# Patient Record
Sex: MALE | Race: AMERICAN INDIAN OR ALASKA NATIVE | NOT HISPANIC OR LATINO | Employment: OTHER | URBAN - METROPOLITAN AREA
[De-identification: names, ages, dates, MRNs, and addresses within clinical notes are randomized per-mention and may not be internally consistent; named-entity substitution may affect disease eponyms.]

---

## 2017-02-21 ENCOUNTER — COMMUNICATION - HEALTHEAST (OUTPATIENT)
Dept: FAMILY MEDICINE | Facility: CLINIC | Age: 62
End: 2017-02-21

## 2017-02-21 DIAGNOSIS — I10 ESSENTIAL HYPERTENSION, BENIGN: ICD-10-CM

## 2017-04-06 ENCOUNTER — COMMUNICATION - HEALTHEAST (OUTPATIENT)
Dept: FAMILY MEDICINE | Facility: CLINIC | Age: 62
End: 2017-04-06

## 2017-04-06 DIAGNOSIS — N52.9 IMPOTENCE OF ORGANIC ORIGIN: ICD-10-CM

## 2017-04-16 ENCOUNTER — COMMUNICATION - HEALTHEAST (OUTPATIENT)
Dept: FAMILY MEDICINE | Facility: CLINIC | Age: 62
End: 2017-04-16

## 2017-04-16 DIAGNOSIS — E78.5 HYPERLIPIDEMIA: ICD-10-CM

## 2017-07-20 ENCOUNTER — COMMUNICATION - HEALTHEAST (OUTPATIENT)
Dept: FAMILY MEDICINE | Facility: CLINIC | Age: 62
End: 2017-07-20

## 2017-07-20 DIAGNOSIS — I10 ESSENTIAL HYPERTENSION, BENIGN: ICD-10-CM

## 2017-09-07 ENCOUNTER — COMMUNICATION - HEALTHEAST (OUTPATIENT)
Dept: FAMILY MEDICINE | Facility: CLINIC | Age: 62
End: 2017-09-07

## 2017-09-07 DIAGNOSIS — N52.9 IMPOTENCE OF ORGANIC ORIGIN: ICD-10-CM

## 2017-11-09 ENCOUNTER — OFFICE VISIT - HEALTHEAST (OUTPATIENT)
Dept: FAMILY MEDICINE | Facility: CLINIC | Age: 62
End: 2017-11-09

## 2017-11-09 DIAGNOSIS — Z12.5 SCREENING FOR PROSTATE CANCER: ICD-10-CM

## 2017-11-09 DIAGNOSIS — K02.9 DENTAL CARIES: ICD-10-CM

## 2017-11-09 DIAGNOSIS — E55.9 VITAMIN D DEFICIENCY DISEASE: ICD-10-CM

## 2017-11-09 DIAGNOSIS — I10 ESSENTIAL HYPERTENSION, BENIGN: ICD-10-CM

## 2017-11-09 DIAGNOSIS — Z00.00 WELL ADULT EXAM: ICD-10-CM

## 2017-11-09 LAB — PSA SERPL-MCNC: 0.9 NG/ML (ref 0–4.5)

## 2017-11-09 ASSESSMENT — MIFFLIN-ST. JEOR: SCORE: 1755.82

## 2017-11-15 ENCOUNTER — COMMUNICATION - HEALTHEAST (OUTPATIENT)
Dept: FAMILY MEDICINE | Facility: CLINIC | Age: 62
End: 2017-11-15

## 2018-01-10 ENCOUNTER — COMMUNICATION - HEALTHEAST (OUTPATIENT)
Dept: FAMILY MEDICINE | Facility: CLINIC | Age: 63
End: 2018-01-10

## 2018-01-10 DIAGNOSIS — I10 ESSENTIAL HYPERTENSION, BENIGN: ICD-10-CM

## 2018-01-13 ENCOUNTER — COMMUNICATION - HEALTHEAST (OUTPATIENT)
Dept: FAMILY MEDICINE | Facility: CLINIC | Age: 63
End: 2018-01-13

## 2018-01-13 DIAGNOSIS — N52.9 IMPOTENCE OF ORGANIC ORIGIN: ICD-10-CM

## 2018-05-04 ENCOUNTER — COMMUNICATION - HEALTHEAST (OUTPATIENT)
Dept: FAMILY MEDICINE | Facility: CLINIC | Age: 63
End: 2018-05-04

## 2018-05-04 DIAGNOSIS — E78.5 HYPERLIPIDEMIA: ICD-10-CM

## 2018-06-11 ENCOUNTER — COMMUNICATION - HEALTHEAST (OUTPATIENT)
Dept: FAMILY MEDICINE | Facility: CLINIC | Age: 63
End: 2018-06-11

## 2018-06-11 DIAGNOSIS — N52.9 IMPOTENCE OF ORGANIC ORIGIN: ICD-10-CM

## 2018-07-01 ENCOUNTER — COMMUNICATION - HEALTHEAST (OUTPATIENT)
Dept: FAMILY MEDICINE | Facility: CLINIC | Age: 63
End: 2018-07-01

## 2018-07-01 DIAGNOSIS — I10 ESSENTIAL HYPERTENSION, BENIGN: ICD-10-CM

## 2018-10-17 ENCOUNTER — COMMUNICATION - HEALTHEAST (OUTPATIENT)
Dept: FAMILY MEDICINE | Facility: CLINIC | Age: 63
End: 2018-10-17

## 2018-10-17 DIAGNOSIS — N52.9 IMPOTENCE OF ORGANIC ORIGIN: ICD-10-CM

## 2019-01-10 ENCOUNTER — COMMUNICATION - HEALTHEAST (OUTPATIENT)
Dept: FAMILY MEDICINE | Facility: CLINIC | Age: 64
End: 2019-01-10

## 2019-01-10 DIAGNOSIS — N52.9 IMPOTENCE OF ORGANIC ORIGIN: ICD-10-CM

## 2019-01-15 ENCOUNTER — COMMUNICATION - HEALTHEAST (OUTPATIENT)
Dept: FAMILY MEDICINE | Facility: CLINIC | Age: 64
End: 2019-01-15

## 2019-01-15 DIAGNOSIS — E78.5 HYPERLIPIDEMIA: ICD-10-CM

## 2019-01-31 ENCOUNTER — COMMUNICATION - HEALTHEAST (OUTPATIENT)
Dept: FAMILY MEDICINE | Facility: CLINIC | Age: 64
End: 2019-01-31

## 2019-01-31 DIAGNOSIS — I10 ESSENTIAL HYPERTENSION, BENIGN: ICD-10-CM

## 2019-02-02 ENCOUNTER — COMMUNICATION - HEALTHEAST (OUTPATIENT)
Dept: FAMILY MEDICINE | Facility: CLINIC | Age: 64
End: 2019-02-02

## 2019-02-02 DIAGNOSIS — N52.9 IMPOTENCE OF ORGANIC ORIGIN: ICD-10-CM

## 2019-02-04 RX ORDER — TADALAFIL 5 MG/1
TABLET ORAL
Qty: 30 TABLET | Refills: 10 | Status: SHIPPED | OUTPATIENT
Start: 2019-02-04 | End: 2021-07-26

## 2019-03-27 ENCOUNTER — COMMUNICATION - HEALTHEAST (OUTPATIENT)
Dept: FAMILY MEDICINE | Facility: CLINIC | Age: 64
End: 2019-03-27

## 2019-04-08 ENCOUNTER — COMMUNICATION - HEALTHEAST (OUTPATIENT)
Dept: FAMILY MEDICINE | Facility: CLINIC | Age: 64
End: 2019-04-08

## 2019-04-08 DIAGNOSIS — E78.5 HYPERLIPIDEMIA: ICD-10-CM

## 2019-04-23 ENCOUNTER — OFFICE VISIT - HEALTHEAST (OUTPATIENT)
Dept: FAMILY MEDICINE | Facility: CLINIC | Age: 64
End: 2019-04-23

## 2019-04-23 ENCOUNTER — AMBULATORY - HEALTHEAST (OUTPATIENT)
Dept: FAMILY MEDICINE | Facility: CLINIC | Age: 64
End: 2019-04-23

## 2019-04-23 DIAGNOSIS — I10 ESSENTIAL HYPERTENSION, BENIGN: ICD-10-CM

## 2019-04-23 DIAGNOSIS — E55.9 VITAMIN D DEFICIENCY DISEASE: ICD-10-CM

## 2019-04-23 DIAGNOSIS — Z12.11 SCREEN FOR COLON CANCER: ICD-10-CM

## 2019-04-23 DIAGNOSIS — N52.9 IMPOTENCE OF ORGANIC ORIGIN: ICD-10-CM

## 2019-04-23 DIAGNOSIS — M25.552 HIP PAIN, LEFT: ICD-10-CM

## 2019-04-23 DIAGNOSIS — Z12.5 SCREENING FOR PROSTATE CANCER: ICD-10-CM

## 2019-04-23 DIAGNOSIS — R31.29 MICROHEMATURIA: ICD-10-CM

## 2019-04-23 DIAGNOSIS — Z00.00 WELL ADULT EXAM: ICD-10-CM

## 2019-04-23 DIAGNOSIS — E78.00 PURE HYPERCHOLESTEROLEMIA: ICD-10-CM

## 2019-04-23 DIAGNOSIS — F41.9 ANXIETY: ICD-10-CM

## 2019-04-23 LAB
ALBUMIN UR-MCNC: NEGATIVE MG/DL
APPEARANCE UR: CLEAR
BACTERIA #/AREA URNS HPF: ABNORMAL HPF
BILIRUB UR QL STRIP: NEGATIVE
COLOR UR AUTO: YELLOW
GLUCOSE UR STRIP-MCNC: NEGATIVE MG/DL
HGB UR QL STRIP: ABNORMAL
HYALINE CASTS #/AREA URNS LPF: ABNORMAL LPF
KETONES UR STRIP-MCNC: NEGATIVE MG/DL
LEUKOCYTE ESTERASE UR QL STRIP: NEGATIVE
MUCOUS THREADS #/AREA URNS LPF: ABNORMAL LPF
NITRATE UR QL: NEGATIVE
PH UR STRIP: 5.5 [PH] (ref 5–8)
RBC #/AREA URNS AUTO: ABNORMAL HPF
SP GR UR STRIP: 1.02 (ref 1–1.03)
SQUAMOUS #/AREA URNS AUTO: ABNORMAL LPF
UROBILINOGEN UR STRIP-ACNC: ABNORMAL
WBC #/AREA URNS AUTO: ABNORMAL HPF

## 2019-04-23 ASSESSMENT — MIFFLIN-ST. JEOR: SCORE: 1762.62

## 2019-04-24 ENCOUNTER — COMMUNICATION - HEALTHEAST (OUTPATIENT)
Dept: FAMILY MEDICINE | Facility: CLINIC | Age: 64
End: 2019-04-24

## 2019-04-24 ENCOUNTER — AMBULATORY - HEALTHEAST (OUTPATIENT)
Dept: FAMILY MEDICINE | Facility: CLINIC | Age: 64
End: 2019-04-24

## 2019-04-24 DIAGNOSIS — R31.29 MICROSCOPIC HEMATURIA: ICD-10-CM

## 2019-05-08 ENCOUNTER — COMMUNICATION - HEALTHEAST (OUTPATIENT)
Dept: FAMILY MEDICINE | Facility: CLINIC | Age: 64
End: 2019-05-08

## 2019-05-17 ENCOUNTER — COMMUNICATION - HEALTHEAST (OUTPATIENT)
Dept: FAMILY MEDICINE | Facility: CLINIC | Age: 64
End: 2019-05-17

## 2019-05-17 DIAGNOSIS — F41.9 ANXIETY: ICD-10-CM

## 2019-07-05 ENCOUNTER — COMMUNICATION - HEALTHEAST (OUTPATIENT)
Dept: FAMILY MEDICINE | Facility: CLINIC | Age: 64
End: 2019-07-05

## 2019-07-05 DIAGNOSIS — E78.5 HYPERLIPIDEMIA: ICD-10-CM

## 2019-10-11 ENCOUNTER — COMMUNICATION - HEALTHEAST (OUTPATIENT)
Dept: FAMILY MEDICINE | Facility: CLINIC | Age: 64
End: 2019-10-11

## 2019-10-11 DIAGNOSIS — N52.9 IMPOTENCE OF ORGANIC ORIGIN: ICD-10-CM

## 2019-11-06 ENCOUNTER — COMMUNICATION - HEALTHEAST (OUTPATIENT)
Dept: FAMILY MEDICINE | Facility: CLINIC | Age: 64
End: 2019-11-06

## 2019-11-06 DIAGNOSIS — N52.9 IMPOTENCE OF ORGANIC ORIGIN: ICD-10-CM

## 2019-12-12 ENCOUNTER — COMMUNICATION - HEALTHEAST (OUTPATIENT)
Dept: FAMILY MEDICINE | Facility: CLINIC | Age: 64
End: 2019-12-12

## 2019-12-12 DIAGNOSIS — N52.9 IMPOTENCE OF ORGANIC ORIGIN: ICD-10-CM

## 2020-01-16 ENCOUNTER — COMMUNICATION - HEALTHEAST (OUTPATIENT)
Dept: FAMILY MEDICINE | Facility: CLINIC | Age: 65
End: 2020-01-16

## 2020-03-14 ENCOUNTER — COMMUNICATION - HEALTHEAST (OUTPATIENT)
Dept: FAMILY MEDICINE | Facility: CLINIC | Age: 65
End: 2020-03-14

## 2020-03-14 DIAGNOSIS — I10 ESSENTIAL HYPERTENSION, BENIGN: ICD-10-CM

## 2020-03-14 DIAGNOSIS — N52.9 IMPOTENCE OF ORGANIC ORIGIN: ICD-10-CM

## 2020-03-30 ENCOUNTER — COMMUNICATION - HEALTHEAST (OUTPATIENT)
Dept: FAMILY MEDICINE | Facility: CLINIC | Age: 65
End: 2020-03-30

## 2020-03-30 DIAGNOSIS — F41.9 ANXIETY: ICD-10-CM

## 2020-04-24 ENCOUNTER — COMMUNICATION - HEALTHEAST (OUTPATIENT)
Dept: FAMILY MEDICINE | Facility: CLINIC | Age: 65
End: 2020-04-24

## 2020-04-24 DIAGNOSIS — N52.9 IMPOTENCE OF ORGANIC ORIGIN: ICD-10-CM

## 2020-05-08 ENCOUNTER — AMBULATORY - HEALTHEAST (OUTPATIENT)
Dept: FAMILY MEDICINE | Facility: CLINIC | Age: 65
End: 2020-05-08

## 2020-05-08 ENCOUNTER — OFFICE VISIT - HEALTHEAST (OUTPATIENT)
Dept: FAMILY MEDICINE | Facility: CLINIC | Age: 65
End: 2020-05-08

## 2020-05-08 DIAGNOSIS — E55.9 VITAMIN D DEFICIENCY DISEASE: ICD-10-CM

## 2020-05-08 DIAGNOSIS — Z12.11 SPECIAL SCREENING FOR MALIGNANT NEOPLASMS, COLON: ICD-10-CM

## 2020-05-08 DIAGNOSIS — Z12.5 SCREENING FOR PROSTATE CANCER: ICD-10-CM

## 2020-05-08 DIAGNOSIS — R03.0 ELEVATED BLOOD PRESSURE READING: ICD-10-CM

## 2020-05-08 DIAGNOSIS — E78.00 PURE HYPERCHOLESTEROLEMIA: ICD-10-CM

## 2020-05-08 DIAGNOSIS — Z11.4 SCREENING FOR HIV (HUMAN IMMUNODEFICIENCY VIRUS): ICD-10-CM

## 2020-05-08 DIAGNOSIS — I10 ESSENTIAL HYPERTENSION, BENIGN: ICD-10-CM

## 2020-05-08 DIAGNOSIS — Z11.59 ENCOUNTER FOR HEPATITIS C SCREENING TEST FOR LOW RISK PATIENT: ICD-10-CM

## 2020-05-08 RX ORDER — AMLODIPINE BESYLATE 5 MG/1
5 TABLET ORAL 2 TIMES DAILY
Qty: 60 TABLET | Refills: 5 | Status: SHIPPED | OUTPATIENT
Start: 2020-05-08 | End: 2021-07-26

## 2020-06-08 ENCOUNTER — COMMUNICATION - HEALTHEAST (OUTPATIENT)
Dept: FAMILY MEDICINE | Facility: CLINIC | Age: 65
End: 2020-06-08

## 2020-06-08 DIAGNOSIS — I10 ESSENTIAL HYPERTENSION, BENIGN: ICD-10-CM

## 2020-06-10 ENCOUNTER — COMMUNICATION - HEALTHEAST (OUTPATIENT)
Dept: FAMILY MEDICINE | Facility: CLINIC | Age: 65
End: 2020-06-10

## 2020-06-10 DIAGNOSIS — E78.5 HYPERLIPIDEMIA: ICD-10-CM

## 2020-06-15 ENCOUNTER — RECORDS - HEALTHEAST (OUTPATIENT)
Dept: ADMINISTRATIVE | Facility: OTHER | Age: 65
End: 2020-06-15

## 2020-06-15 LAB — COLOGUARD-ABSTRACT: NEGATIVE

## 2020-06-30 ENCOUNTER — COMMUNICATION - HEALTHEAST (OUTPATIENT)
Dept: FAMILY MEDICINE | Facility: CLINIC | Age: 65
End: 2020-06-30

## 2020-06-30 ENCOUNTER — RECORDS - HEALTHEAST (OUTPATIENT)
Dept: ADMINISTRATIVE | Facility: OTHER | Age: 65
End: 2020-06-30

## 2020-07-05 ENCOUNTER — COMMUNICATION - HEALTHEAST (OUTPATIENT)
Dept: FAMILY MEDICINE | Facility: CLINIC | Age: 65
End: 2020-07-05

## 2020-07-05 DIAGNOSIS — F41.9 ANXIETY: ICD-10-CM

## 2020-07-07 ENCOUNTER — RECORDS - HEALTHEAST (OUTPATIENT)
Dept: HEALTH INFORMATION MANAGEMENT | Facility: CLINIC | Age: 65
End: 2020-07-07

## 2020-07-15 ENCOUNTER — COMMUNICATION - HEALTHEAST (OUTPATIENT)
Dept: FAMILY MEDICINE | Facility: CLINIC | Age: 65
End: 2020-07-15

## 2020-07-15 DIAGNOSIS — N52.9 IMPOTENCE OF ORGANIC ORIGIN: ICD-10-CM

## 2020-07-25 ENCOUNTER — COMMUNICATION - HEALTHEAST (OUTPATIENT)
Dept: FAMILY MEDICINE | Facility: CLINIC | Age: 65
End: 2020-07-25

## 2020-07-25 DIAGNOSIS — I10 ESSENTIAL HYPERTENSION, BENIGN: ICD-10-CM

## 2020-08-08 ENCOUNTER — COMMUNICATION - HEALTHEAST (OUTPATIENT)
Dept: FAMILY MEDICINE | Facility: CLINIC | Age: 65
End: 2020-08-08

## 2020-08-08 DIAGNOSIS — F41.9 ANXIETY: ICD-10-CM

## 2020-08-08 DIAGNOSIS — N52.9 IMPOTENCE OF ORGANIC ORIGIN: ICD-10-CM

## 2020-08-09 ENCOUNTER — COMMUNICATION - HEALTHEAST (OUTPATIENT)
Dept: FAMILY MEDICINE | Facility: CLINIC | Age: 65
End: 2020-08-09

## 2020-08-09 DIAGNOSIS — F41.9 ANXIETY: ICD-10-CM

## 2020-08-10 ENCOUNTER — COMMUNICATION - HEALTHEAST (OUTPATIENT)
Dept: FAMILY MEDICINE | Facility: CLINIC | Age: 65
End: 2020-08-10

## 2020-08-10 DIAGNOSIS — F41.9 ANXIETY: ICD-10-CM

## 2020-08-10 RX ORDER — ESCITALOPRAM OXALATE 10 MG/1
TABLET ORAL
Qty: 15 TABLET | Refills: 0 | Status: SHIPPED | OUTPATIENT
Start: 2020-08-10 | End: 2021-07-26

## 2020-08-10 RX ORDER — SILDENAFIL CITRATE 20 MG/1
TABLET ORAL
Qty: 90 TABLET | Refills: 0 | Status: SHIPPED | OUTPATIENT
Start: 2020-08-10 | End: 2021-07-26

## 2020-08-11 ENCOUNTER — COMMUNICATION - HEALTHEAST (OUTPATIENT)
Dept: FAMILY MEDICINE | Facility: CLINIC | Age: 65
End: 2020-08-11

## 2020-08-11 DIAGNOSIS — I10 ESSENTIAL HYPERTENSION, BENIGN: ICD-10-CM

## 2020-08-29 ENCOUNTER — COMMUNICATION - HEALTHEAST (OUTPATIENT)
Dept: FAMILY MEDICINE | Facility: CLINIC | Age: 65
End: 2020-08-29

## 2020-08-29 DIAGNOSIS — E78.5 HYPERLIPIDEMIA: ICD-10-CM

## 2020-09-01 RX ORDER — ATORVASTATIN CALCIUM 20 MG/1
TABLET, FILM COATED ORAL
Qty: 90 TABLET | Refills: 2 | Status: SHIPPED | OUTPATIENT
Start: 2020-09-01 | End: 2021-07-26

## 2020-11-03 ENCOUNTER — COMMUNICATION - HEALTHEAST (OUTPATIENT)
Dept: FAMILY MEDICINE | Facility: CLINIC | Age: 65
End: 2020-11-03

## 2020-11-03 DIAGNOSIS — F41.9 ANXIETY: ICD-10-CM

## 2020-11-13 ENCOUNTER — COMMUNICATION - HEALTHEAST (OUTPATIENT)
Dept: FAMILY MEDICINE | Facility: CLINIC | Age: 65
End: 2020-11-13

## 2020-11-13 DIAGNOSIS — I10 ESSENTIAL HYPERTENSION, BENIGN: ICD-10-CM

## 2020-12-21 ENCOUNTER — COMMUNICATION - HEALTHEAST (OUTPATIENT)
Dept: FAMILY MEDICINE | Facility: CLINIC | Age: 65
End: 2020-12-21

## 2020-12-21 DIAGNOSIS — I10 ESSENTIAL HYPERTENSION, BENIGN: ICD-10-CM

## 2021-01-05 ENCOUNTER — COMMUNICATION - HEALTHEAST (OUTPATIENT)
Dept: INTERNAL MEDICINE | Facility: CLINIC | Age: 66
End: 2021-01-05

## 2021-01-05 DIAGNOSIS — I10 ESSENTIAL HYPERTENSION, BENIGN: ICD-10-CM

## 2021-02-04 ENCOUNTER — COMMUNICATION - HEALTHEAST (OUTPATIENT)
Dept: INTERNAL MEDICINE | Facility: CLINIC | Age: 66
End: 2021-02-04

## 2021-02-04 DIAGNOSIS — I10 ESSENTIAL HYPERTENSION, BENIGN: ICD-10-CM

## 2021-02-05 ENCOUNTER — COMMUNICATION - HEALTHEAST (OUTPATIENT)
Dept: FAMILY MEDICINE | Facility: CLINIC | Age: 66
End: 2021-02-05

## 2021-02-05 DIAGNOSIS — F41.9 ANXIETY: ICD-10-CM

## 2021-02-05 RX ORDER — ESCITALOPRAM OXALATE 10 MG/1
10 TABLET ORAL DAILY
Qty: 90 TABLET | Refills: 0 | Status: SHIPPED | OUTPATIENT
Start: 2021-02-05 | End: 2021-07-26

## 2021-03-31 ENCOUNTER — COMMUNICATION - HEALTHEAST (OUTPATIENT)
Dept: FAMILY MEDICINE | Facility: CLINIC | Age: 66
End: 2021-03-31

## 2021-03-31 DIAGNOSIS — I10 ESSENTIAL HYPERTENSION, BENIGN: ICD-10-CM

## 2021-04-16 ENCOUNTER — COMMUNICATION - HEALTHEAST (OUTPATIENT)
Dept: INTERNAL MEDICINE | Facility: CLINIC | Age: 66
End: 2021-04-16

## 2021-04-16 DIAGNOSIS — I10 ESSENTIAL HYPERTENSION, BENIGN: ICD-10-CM

## 2021-04-19 RX ORDER — LISINOPRIL AND HYDROCHLOROTHIAZIDE 12.5; 2 MG/1; MG/1
TABLET ORAL
Qty: 90 TABLET | Refills: 0 | Status: SHIPPED | OUTPATIENT
Start: 2021-04-19 | End: 2021-07-26

## 2021-04-29 ENCOUNTER — COMMUNICATION - HEALTHEAST (OUTPATIENT)
Dept: FAMILY MEDICINE | Facility: CLINIC | Age: 66
End: 2021-04-29

## 2021-04-29 DIAGNOSIS — E78.5 HYPERLIPIDEMIA: ICD-10-CM

## 2021-05-26 ENCOUNTER — RECORDS - HEALTHEAST (OUTPATIENT)
Dept: INTERNAL MEDICINE | Facility: CLINIC | Age: 66
End: 2021-05-26

## 2021-05-27 NOTE — TELEPHONE ENCOUNTER
Left message to call back for: Pt  Information to relay to patient:  Rx for Atorvastatin was not refused.  Pt has not been seen since 11/2017.  Pt does have an appt on 4/23/19 and have pt keep appt with Dr. Mckenzie.

## 2021-05-27 NOTE — TELEPHONE ENCOUNTER
RN cannot approve Refill Request    RN can NOT refill this medication PCP messaged that patient is overdue for Office Visit.       Helen [place]en[place], [facility] Triage/Med Refill 4/9/2019    Requested Prescriptions   Pending Prescriptions Disp Refills     atorvastatin (LIPITOR) 20 MG tablet [Pharmacy Med Name: ATORVASTATIN 20 MG TABLET] 90 tablet 3     Sig: TAKE 1 TABLET BY MOUTH EVERYDAY AT BEDTIME       Statins Refill Protocol (Hmg CoA Reductase Inhibitors) Failed - 4/8/2019  9:33 AM        Failed - PCP or prescribing provider visit in past 12 months      Last office visit with prescriber/PCP: 11/9/2017 Jose Maria Mckenzie MD OR same dept: Visit date not found OR same specialty: 11/9/2017 Jose Maria Mckenzie MD  Last physical: 1/11/2016 Last MTM visit: Visit date not found   Next visit within 3 mo: Visit date not found  Next physical within 3 mo: Visit date not found  Prescriber OR PCP: Jose Maria Mckenzie MD  Last diagnosis associated with med order: 1. Hyperlipidemia  - atorvastatin (LIPITOR) 20 MG tablet [Pharmacy Med Name: ATORVASTATIN 20 MG TABLET]; TAKE 1 TABLET BY MOUTH EVERYDAY AT BEDTIME  Dispense: 90 tablet; Refill: 0    If protocol passes may refill for 12 months if within 3 months of last provider visit (or a total of 15 months).

## 2021-05-27 NOTE — TELEPHONE ENCOUNTER
Patient Returning Call  Reason for call:  Medication refill  Information relayed to patient:  Dr. Mckenzie's message  Patient has additional questions:  No  If YES, what are your questions/concerns:  n/a  Okay to leave a detailed message?: No call back needed

## 2021-05-28 NOTE — PATIENT INSTRUCTIONS - HE
See a chiropractor for the left hip      New dose of Lisinopril HCT 20/12.5  Increase to 2 Daily     Goals BP < 130 top and < 85 bottom      Fix that lower right molar      Use Good Rx for Sildenafil  1 to 5 tabs per sexual episode.    CBD may help pain and anxiety    Start Lexapro 10 mg  1/2 daily for 8 days then 1 daily    CHeck in with me in 6 weeks

## 2021-05-28 NOTE — TELEPHONE ENCOUNTER
RN cannot approve Refill Request    RN can NOT refill this medication Needs dose/sig clarification.     Last office visit: 11/9/2017 Jose Maria Mckenzie MD Last Physical: 4/23/2019 Last MTM visit: Visit date not found Last visit same specialty: 11/9/2017 Jose Maria Mckenzie MD.  Next visit within 3 mo: Visit date not found  Next physical within 3 mo: Visit date not found      Lori Porter, [facility] Triage/Med Refill 5/18/2019    Requested Prescriptions   Pending Prescriptions Disp Refills     escitalopram oxalate (LEXAPRO) 10 MG tablet [Pharmacy Med Name: ESCITALOPRAM 10 MG TABLET] 30 tablet 0     Sig: TAKE 1/2 TABLET BY MOUTH FOR 14 DAYS, THEN 1 DAILY FOR ANXIETY       [facility] Refill Protocol  Failed - 5/18/2019  7:34 PM        Failed - Age 21 and younger route to prescribing provider     Last office visit with prescriber/PCP: 11/9/2017 Jose Maria Mckenzie MD OR same dept: Visit date not found OR same specialty: 11/9/2017 Jose Maria Mckenzie MD  Last physical: 4/23/2019 Last MTM visit: Visit date not found   Next visit within 3 mo: Visit date not found  Next physical within 3 mo: Visit date not found  Prescriber OR PCP: Jose Maria Mckenzie MD  Last diagnosis associated with med order: 1. Anxiety  - escitalopram oxalate (LEXAPRO) 10 MG tablet [Pharmacy Med Name: ESCITALOPRAM 10 MG TABLET]; TAKE 1/2 TABLET BY MOUTH FOR 14 DAYS, THEN 1 DAILY FOR ANXIETY  Dispense: 30 tablet; Refill: 0    If protocol passes may refill for 12 months if within 3 months of last provider visit (or a total of 15 months).             Passed - PCP or prescribing provider visit in last year     Last office visit with prescriber/PCP: 11/9/2017 Jose Maria Mckenzie MD OR same dept: Visit date not found OR same specialty: 11/9/2017 Jose Maria Mckenzie MD  Last physical: 4/23/2019 Last MTM visit: Visit date not found   Next visit within 3 mo: Visit date not found  Next physical within 3 mo: Visit date not found  Prescriber OR PCP: Jose Maria Mckenzie MD  Last  diagnosis associated with med order: 1. Anxiety  - escitalopram oxalate (LEXAPRO) 10 MG tablet [Pharmacy Med Name: ESCITALOPRAM 10 MG TABLET]; TAKE 1/2 TABLET BY MOUTH FOR 14 DAYS, THEN 1 DAILY FOR ANXIETY  Dispense: 30 tablet; Refill: 0    If protocol passes may refill for 12 months if within 3 months of last provider visit (or a total of 15 months).

## 2021-05-28 NOTE — PROGRESS NOTES
ASSESSMENT & PLAN    No problem-specific Assessment & Plan notes found for this encounter.      Raji was seen today for annual exam.    Diagnoses and all orders for this visit:    Well adult exam  -     Urinalysis-UC if Indicated    Vitamin D deficiency disease  -     Cancel: Vitamin D, Total (25-Hydroxy)  -     Cancel: Vitamin D, Total (25-Hydroxy)  -     Vitamin D, Total (25-Hydroxy); Future    Screening for prostate cancer  -     Cancel: PSA (Prostatic-Specific Antigen), Annual Screen  -     PSA, Annual Screen (Prostatic-Specific Antigen); Future    Benign Essential Hypertension  -     Cancel: Vitamin B12  -     Cancel: Thyroid [place]  -     Cancel: Comprehensive Metabolic Panel  -     Cancel: HM1(CBC and Differential)  -     Cancel: LipoFit by NMR  -     Cancel: HM1 (CBC with Diff)  -     HM1 (CBC and Differential); Future  -     Comprehensive Metabolic Panel; Future  -     LipoFit by NMR; Future  -     Thyroid Cascade; Future  -     Vitamin B12; Future  -     lisinopril-hydrochlorothiazide (PRINZIDE,ZESTORETIC) 20-12.5 mg per tablet; Take 2 tablets by mouth daily.    Hypercholesterolemia  -     Cancel: LipoFit by NMR  -     LipoFit by NMR; Future    Screen for colon cancer  -     Cancel: Cologuard  -     Ambulatory referral for Colonoscopy    Anxiety  -     escitalopram oxalate (LEXAPRO) 10 MG tablet; 1/2 tablet for 14 days then 1 daily for anxiety    Male Erectile Disorder Due To Physical Condition  -     sildenafil, antihypertensive, (REVATIO) 20 mg tablet; 1 to 5 tabs 1 hours prior to sexual activity    Hip pain, left        Patient Instructions   See a chiropractor for the left hip      New dose of Lisinopril HCT 20/12.5  Increase to 2 Daily     Goals BP < 130 top and < 85 bottom      Fix that lower right molar      Use Good Rx for Sildenafil  1 to 5 tabs per sexual episode.    CBD may help pain and anxiety    Start Lexapro 10 mg  1/2 daily for 8 days then 1 daily    CHeck in with me in 6 weeks          Return in about 3 months (around 7/23/2019).       Little interest or pleasure in doing things: Not at all  Feeling down, depressed, or hopeless: Not at all    CHIEF COMPLAINT: Raji Chahal had concerns including Annual Exam (non-fasting).    [place]: 1.............. had concerns including Annual Exam (non-fasting).    1. Well adult exam    2. Vitamin D deficiency disease    3. Screening for prostate cancer    4. Benign Essential Hypertension    5. Hypercholesterolemia    6. Screen for colon cancer    7. Anxiety    8. Male Erectile Disorder Due To Physical Condition    9. Hip pain, left          CC:             Why are you here today?                              Here for an annual exam and no concerns except    Blood Pressure  Less active   Anxiety    Sis with Melanoma    4 kids    Stress Wife recent Breast Cancer.    Recheck BP  3 times   Breathing Exercises            Any other Problems in order of Priority:        SUBJECTIVE:  Raji Chahal is a 63 y.o. male    Past Medical History:   Diagnosis Date     Hypertension      Low back pain      Varicose vein      Past Surgical History:   Procedure Laterality Date     CYSTOSCOPY       Penicillins  Current Outpatient Medications   Medication Sig Dispense Refill     aspirin 81 mg TbEF Take 1 tablet by mouth daily.       atorvastatin (LIPITOR) 20 MG tablet TAKE 1 TABLET BY MOUTH EVERYDAY AT BEDTIME 90 tablet 0     coenzyme Q10 200 mg capsule 1 capsule daily 30 capsule 12     HYDROcodone-acetaminophen (NORCO) 7.5-325 mg per tablet Take 1-2 tablets by mouth every 6 (six) hours as needed for pain. 30 tablet 0     omega-3 fatty acids-vitamin E (FISH OIL) 1,000 mg cap 2 Capsules Daily 60 each 12     tadalafil (CIALIS) 5 MG tablet TAKE 1 TABLET BY MOUTH EVERY DAY AS NEEDED FOR ERECTILE DYSFUNCTION 30 tablet 10     acetylcarnitine 500 mg cap 500 MG  2 CAPSULES DAILY 60 capsule 12     alpha lipoic acid 300 mg cap 2 AM 1 PM 90 each 12     escitalopram oxalate (LEXAPRO) 10  MG tablet 1/2 tablet for 14 days then 1 daily for anxiety 30 tablet 2     lisinopril-hydrochlorothiazide (PRINZIDE,ZESTORETIC) 20-12.5 mg per tablet Take 2 tablets by mouth daily. 180 tablet 3     multivitamin capsule Take 1 capsule by mouth daily.       resveratrol 250 mg cap Take 250 mg by mouth daily. 30 capsule 12     selenium 200 mcg cap 1 CAPSULE DAILY 30 each 12     sildenafil, antihypertensive, (REVATIO) 20 mg tablet 1 to 5 tabs 1 hours prior to sexual activity 90 tablet 3     No current facility-administered medications for this visit.      Family History   Problem Relation Age of Onset     Hypertension Mother      Heart failure Mother      Diabetes Father      Cancer Sister         melanoma      Seizures Son      Social History     Socioeconomic History     Marital status:      Spouse name: None     Number of children: None     Years of education: None     Highest education level: None   Occupational History     None   Social Needs     Financial resource strain: None     Food insecurity:     Worry: None     Inability: None     Transportation needs:     Medical: None     Non-medical: None   Tobacco Use     Smoking status: Former Smoker     Packs/day: 0.50     Years: 10.00     Pack years: 5.00     Types: Cigarettes     Smokeless tobacco: Former User   Substance and Sexual Activity     Alcohol use: Yes     Alcohol/week: 1.2 oz     Types: 2 Cans of beer per week     Drug use: Yes     Types: Marijuana     Sexual activity: Yes     Partners: Female     Birth control/protection: None   Lifestyle     Physical activity:     Days per week: None     Minutes per session: None     Stress: None   Relationships     Social connections:     Talks on phone: None     Gets together: None     Attends [employer] service: None     Active member of club or organization: None     Attends meetings of clubs or organizations: None     Relationship status: None     Intimate partner violence:     Fear of current or ex partner:  None     Emotionally abused: None     Physically abused: None     Forced sexual activity: None   Other Topics Concern     None   Social History Narrative     None     Patient Active Problem List   Diagnosis     Benign Essential Hypertension     Male Erectile Disorder Due To Physical Condition     Hypercholesterolemia     Lumbar Strain     Dental caries     Anxiety     Microscopic hematuria  4/22/19  Moderate Blood   Asked to repeat in 1 week                                              SOCIAL: He  reports that he has quit smoking. His smoking use included cigarettes. He has a 5.00 pack-year smoking history. He has quit using smokeless tobacco. He reports that he drinks about 1.2 oz of alcohol per week. He reports that he has current or past drug history. Drug: Marijuana.    REVIEW OF SYSTEMS:   Family history not pertinent to chief complaint or presenting problem    Review of Systems:      Nervous System:  No headache, paresthesia or dizziness or fainting                                  Ears: No hearing loss or ringing in the ears    Eyes: No blurring of vision, Double Vision            No redness, itching or dryness.    Nose: No nosebleed or loss of smell    Mouth: No mouth sores or  coated tongue    Throat: No hoarseness or difficulty swallowing    Neck: No enlarged thyroid or lymph nodes.    Heart: No chest pain, palpitation or irregular heartbeat.                  Lungs: No shortness of breath, wheezing or hemoptysis.    Gastrointestinal: No nausea or vomiting, melena or blood in stools.    Kidney/Bladdr: No polyuria, polydipsia, or hematuria.                             Genital/Sexual: No Sex function Changes ongoing erectile dysfunction                                 Skin: No rash    Muscles/Joints/Bones: No Muscle morning stiffness, No Effusion of a Joint     Anxiety ++        Review of systems otherwise negative as requested from patient, except   Those positive ROS outlined and discussed in  "[place].    OBJECTIVE:  BP (!) 168/108 (Patient Site: Right Arm, Patient Position: Sitting, Cuff Size: Adult Regular)   Pulse 89   Ht 5' 9.5\" (1.765 m)   Wt 217 lb (98.4 kg)   BMI 31.59 kg/m      GENERAL:     No acute distress.   Alert and oriented X 3         Physical:        Physical:  General Appearance: Healthy-appearingy.   Head:  No deformity  Eyes: Sclerae white, pupils equal and reactive, extra ocular movements intact   Ears: Well-positioned, well-formed pinnae; TM pearly white, translucent, no bulging   Nose: Clear, normal mucosa no drainage or crusting  Throat: Lips, tongue, and mucosa are moist, pink and intact; tongue no thrush oral pharynx no injection or lesions  Neck: Supple, symmetric ROM no nodes   No carotid Buits  Chest: Lungs clear to auscultation, no retractions  Heart: Regular rate & rhythm, S1 S2, no murmur  Abdomen: Soft, non-tender, no masses; umbilical area normal   BMI  Up   Pulses: Equal femoral pulses  : No hernia palpable   Extremities: Well-perfused, warm and dry, No Edema  Palpable Pulses Bilateral  Neuro: Easily aroused good tone NO tremor   Skin  No Rash NEVUS 2 MM X 1 MM RIGHT NECK     nontender bursa left  Mild Gluteal insertion troch tender      ASSESSMENT & PLAN      Raji was seen today for annual exam.    Diagnoses and all orders for this visit:    Well adult exam  -     Urinalysis-UC if Indicated    Vitamin D deficiency disease  -     Cancel: Vitamin D, Total (25-Hydroxy)  -     Cancel: Vitamin D, Total (25-Hydroxy)  -     Vitamin D, Total (25-Hydroxy); Future    Screening for prostate cancer  -     Cancel: PSA (Prostatic-Specific Antigen), Annual Screen  -     PSA, Annual Screen (Prostatic-Specific Antigen); Future    Benign Essential Hypertension  -     Cancel: Vitamin B12  -     Cancel: Thyroid [place]  -     Cancel: Comprehensive Metabolic Panel  -     Cancel: HM1(CBC and Differential)  -     Cancel: LipoFit by NMR  -     Cancel: HM1 (CBC with Diff)  -     HM1 (CBC " and Differential); Future  -     Comprehensive Metabolic Panel; Future  -     LipoFit by NMR; Future  -     Thyroid Cascade; Future  -     Vitamin B12; Future  -     lisinopril-hydrochlorothiazide (PRINZIDE,ZESTORETIC) 20-12.5 mg per tablet; Take 2 tablets by mouth daily.    Hypercholesterolemia  -     Cancel: LipoFit by NMR  -     LipoFit by NMR; Future    Screen for colon cancer  -     Cancel: Cologuard  -     Ambulatory referral for Colonoscopy    Anxiety  -     escitalopram oxalate (LEXAPRO) 10 MG tablet; 1/2 tablet for 14 days then 1 daily for anxiety    Male Erectile Disorder Due To Physical Condition  -     sildenafil, antihypertensive, (REVATIO) 20 mg tablet; 1 to 5 tabs 1 hours prior to sexual activity    Hip pain, left        Return in about 3 months (around 7/23/2019).       Anticipatory Guidance and Symptomatic Cares Discussed   Advised to call back directly if there are further questions, or if these symptoms fail to improve as anticipated or worsen.  Return to clinic if patient has a clinical concern that warrants an exam.         I spent 30 minutes with this patient face to face, of which 50% or greater was spent in counseling and coordination of care with regards to Raji was seen today for annual exam.    Diagnoses and all orders for this visit:    Well adult exam  -     Urinalysis-UC if Indicated    Vitamin D deficiency disease  -     Cancel: Vitamin D, Total (25-Hydroxy)  -     Cancel: Vitamin D, Total (25-Hydroxy)  -     Vitamin D, Total (25-Hydroxy); Future    Screening for prostate cancer  -     Cancel: PSA (Prostatic-Specific Antigen), Annual Screen  -     PSA, Annual Screen (Prostatic-Specific Antigen); Future    Benign Essential Hypertension  -     Cancel: Vitamin B12  -     Cancel: Thyroid [place]  -     Cancel: Comprehensive Metabolic Panel  -     Cancel: HM1(CBC and Differential)  -     Cancel: LipoFit by NMR  -     Cancel: HM1 (CBC with Diff)  -     HM1 (CBC and Differential); Future  -      Comprehensive Metabolic Panel; Future  -     LipoFit by NMR; Future  -     Thyroid Cascade; Future  -     Vitamin B12; Future  -     lisinopril-hydrochlorothiazide (PRINZIDE,ZESTORETIC) 20-12.5 mg per tablet; Take 2 tablets by mouth daily.    Hypercholesterolemia  -     Cancel: LipoFit by NMR  -     LipoFit by NMR; Future    Screen for colon cancer  -     Cancel: Cologuard  -     Ambulatory referral for Colonoscopy    Anxiety  -     escitalopram oxalate (LEXAPRO) 10 MG tablet; 1/2 tablet for 14 days then 1 daily for anxiety    Male Erectile Disorder Due To Physical Condition  -     sildenafil, antihypertensive, (REVATIO) 20 mg tablet; 1 to 5 tabs 1 hours prior to sexual activity    Hip pain, left        Jose Maria Mckenzie MD  [facility] [id number] [phone]

## 2021-05-30 NOTE — TELEPHONE ENCOUNTER
Refill Approved    Rx renewed per Medication Renewal Policy. Medication was last renewed on 1/16/19  OV 4/23/19.    Lola Rodriguez, [facility] Triage/Med Refill 7/6/2019     Requested Prescriptions   Pending Prescriptions Disp Refills     atorvastatin (LIPITOR) 20 MG tablet [Pharmacy Med Name: ATORVASTATIN 20 MG TABLET] 90 tablet 0     Sig: TAKE 1 TABLET BY MOUTH EVERYDAY AT BEDTIME       Statins Refill Protocol (Hmg CoA Reductase Inhibitors) Passed - 7/5/2019  4:44 PM        Passed - PCP or prescribing provider visit in past 12 months      Last office visit with prescriber/PCP: 11/9/2017 Jose Maria Mckenzie MD OR same dept: Visit date not found OR same specialty: 11/9/2017 Jose Maria Mckenzie MD  Last physical: 4/23/2019 Last MTM visit: Visit date not found   Next visit within 3 mo: Visit date not found  Next physical within 3 mo: Visit date not found  Prescriber OR PCP: Jose Maria Mckenzie MD  Last diagnosis associated with med order: 1. Hyperlipidemia  - atorvastatin (LIPITOR) 20 MG tablet [Pharmacy Med Name: ATORVASTATIN 20 MG TABLET]; TAKE 1 TABLET BY MOUTH EVERYDAY AT BEDTIME  Dispense: 90 tablet; Refill: 0    If protocol passes may refill for 12 months if within 3 months of last provider visit (or a total of 15 months).

## 2021-05-31 VITALS — BODY MASS INDEX: 29.68 KG/M2 | WEIGHT: 212 LBS | HEIGHT: 71 IN

## 2021-06-02 VITALS — HEIGHT: 70 IN | WEIGHT: 217 LBS | BODY MASS INDEX: 31.07 KG/M2

## 2021-06-02 NOTE — TELEPHONE ENCOUNTER
Refill Approved    Rx renewed per Medication Renewal Policy. Medication was last renewed on 4/23/19    Mary Arzate, [facility] Triage/Med Refill 10/12/2019     Requested Prescriptions   Pending Prescriptions Disp Refills     sildenafil (REVATIO) 20 mg tablet [Pharmacy Med Name: SILDENAFIL 20 MG TABLET] 90 tablet 3     Sig: TAKE 1 TO 5 TABLETS BY MOUTH 1 HOUR BEFORE SEXUAL ACTIVITY       Medications for Impotence Refill Protocol Passed - 10/11/2019  9:16 AM        Passed - PCP or prescribing provider visit in last year     Last office visit with prescriber/PCP: 11/9/2017 Jose Maria Mckenzie MD OR same dept: Visit date not found OR same specialty: 11/9/2017 Jose Maria Mckenzie MD  Last physical: 4/23/2019 Last MTM visit: Visit date not found   Next visit within 3 mo: Visit date not found  Next physical within 3 mo: Visit date not found  Prescriber OR PCP: Jose Maria Mckenzie MD  Last diagnosis associated with med order: 1. Male Erectile Disorder Due To Physical Condition  - sildenafil (REVATIO) 20 mg tablet [Pharmacy Med Name: SILDENAFIL 20 MG TABLET]; TAKE 1 TO 5 TABLETS BY MOUTH 1 HOUR BEFORE SEXUAL ACTIVITY  Dispense: 90 tablet; Refill: 3    If protocol passes may refill for 12 months if within 3 months of last provider visit (or a total of 15 months).

## 2021-06-03 NOTE — TELEPHONE ENCOUNTER
RN cannot approve Refill Request   90 tab, 1 refill ordered 10/12/19  RN can NOT refill this medication  physical within 3 mo: Visit date not found      Razia Cano, [facility] Connection Triage/Med Refill 11/6/2019    Requested Prescriptions   Pending Prescriptions Disp Refills     sildenafil (REVATIO) 20 mg tablet [Pharmacy Med Name: SILDENAFIL 20 MG TABLET] 90 tablet 1     Sig: TAKE 1 TO 5 TABLETS BY MOUTH 1 HOUR BEFORE SEXUAL ACTIVITY       Medications for Impotence Refill Protocol Passed - 11/6/2019  7:32 AM        Passed - PCP or prescribing provider visit in last year     Last office visit with prescriber/PCP: 11/9/2017 Jose Maria Mckenzie MD OR same dept: Visit date not found OR same specialty: 11/9/2017 Jose Maria Mckenzie MD  Last physical: 4/23/2019 Last MTM visit: Visit date not found   Next visit within 3 mo: Visit date not found  Next physical within 3 mo: Visit date not found  Prescriber OR PCP: Jose Maria Mckenzie MD  Last diagnosis associated with med order: 1. Male Erectile Disorder Due To Physical Condition  - sildenafil (REVATIO) 20 mg tablet [Pharmacy Med Name: SILDENAFIL 20 MG TABLET]; TAKE 1 TO 5 TABLETS BY MOUTH 1 HOUR BEFORE SEXUAL ACTIVITY  Dispense: 90 tablet; Refill: 1    If protocol passes may refill for 12 months if within 3 months of last provider visit (or a total of 15 months).

## 2021-06-04 NOTE — TELEPHONE ENCOUNTER
RN cannot approve Refill Request    RN can NOT refill this medication PCP to review. Last office visit: 11/9/2017 Jose Maria Mckenzie MD Last Physical: 4/23/2019 Last MTM visit: Visit date not found Last visit same specialty: 11/9/2017 Jose Maria Mckenzie MD.  Next visit within 3 mo: Visit date not found  Next physical within 3 mo: Visit date not found      Magalis Zuniga, [facility] Triage/Med Refill 12/14/2019    Requested Prescriptions   Pending Prescriptions Disp Refills     sildenafil (REVATIO) 20 mg tablet [Pharmacy Med Name: SILDENAFIL 20 MG TABLET] 90 tablet 1     Sig: TAKE 1 TO 5 TABLETS BY MOUTH 1 HOUR BEFORE SEXUAL ACTIVITY       Medications for Impotence Refill Protocol Passed - 12/12/2019 10:31 AM        Passed - PCP or prescribing provider visit in last year     Last office visit with prescriber/PCP: 11/9/2017 Jose Maria Mckenzie MD OR same dept: Visit date not found OR same specialty: 11/9/2017 Jose Maria Mckenzie MD  Last physical: 4/23/2019 Last MTM visit: Visit date not found   Next visit within 3 mo: Visit date not found  Next physical within 3 mo: Visit date not found  Prescriber OR PCP: Jose Maria Mckenzie MD  Last diagnosis associated with med order: 1. Male Erectile Disorder Due To Physical Condition  - sildenafil (REVATIO) 20 mg tablet [Pharmacy Med Name: SILDENAFIL 20 MG TABLET]; TAKE 1 TO 5 TABLETS BY MOUTH 1 HOUR BEFORE SEXUAL ACTIVITY  Dispense: 90 tablet; Refill: 1    If protocol passes may refill for 12 months if within 3 months of last provider visit (or a total of 15 months).

## 2021-06-05 NOTE — TELEPHONE ENCOUNTER
Left message to call back for: pt  Information to relay to patient:  Pt is overdue for BP check and 3 month f/u. Last BP on 4/23/2019 was elevated at 168/108.  Please help schedule f/u appointment with Dr. Mckenzie when pt call back. xl

## 2021-06-07 NOTE — TELEPHONE ENCOUNTER
RN cannot approve Refill Request    RN can NOT refill this medication Protocol failed and NO refill given         Helen Montenegro, [facility] Triage/Med Refill 4/24/2020    Requested Prescriptions   Pending Prescriptions Disp Refills     sildenafil (REVATIO) 20 mg tablet [Pharmacy Med Name: SILDENAFIL 20 MG TABLET] 90 tablet 6     Sig: TAKE 1 TO 5 TABLETS BY MOUTH 1 HOUR BEFORE SEXUAL ACTIVITY       Medications for Impotence Refill Protocol Failed - 4/24/2020 12:09 AM        Failed - PCP or prescribing provider visit in last year     Last office visit with prescriber/PCP: 11/9/2017 Jose Maria Mckenzie MD OR same dept: Visit date not found OR same specialty: 11/9/2017 Jose Maria Mckenzie MD  Last physical: 4/23/2019 Last MTM visit: Visit date not found   Next visit within 3 mo: Visit date not found  Next physical within 3 mo: Visit date not found  Prescriber OR PCP: Jose Maria Mckenzie MD  Last diagnosis associated with med order: 1. Male Erectile Disorder Due To Physical Condition  - sildenafil (REVATIO) 20 mg tablet [Pharmacy Med Name: SILDENAFIL 20 MG TABLET]; TAKE 1 TO 5 TABLETS BY MOUTH 1 HOUR BEFORE SEXUAL ACTIVITY  Dispense: 90 tablet; Refill: 0    If protocol passes may refill for 12 months if within 3 months of last provider visit (or a total of 15 months).

## 2021-06-07 NOTE — TELEPHONE ENCOUNTER
Left message. Please relay MD's message and help schedule phone visit for pt with Dr. Mckenzie. xl

## 2021-06-08 NOTE — TELEPHONE ENCOUNTER
RN cannot approve Refill Request    RN can NOT refill this medication PCP messaged that patient is overdue for Labs and Office Visit. Last office visit: 11/9/2017 Jose Maria Mckenzie MD Last Physical: 4/23/2019 Last MTM visit: Visit date not found Last visit same specialty: 11/9/2017 Jose Maria Mckenzie MD.  Next visit within 3 mo: Visit date not found  Next physical within 3 mo: Visit date not found      Heidy RICKS Tory, [facility] Triage/Med Refill 6/10/2020    Requested Prescriptions   Pending Prescriptions Disp Refills     lisinopriL-hydrochlorothiazide (PRINZIDE,ZESTORETIC) 20-12.5 mg per tablet [Pharmacy Med Name: LISINOPRIL-HCTZ 20-12.5 MG TAB] 180 tablet 0     Sig: TAKE 2 TABLETS BY MOUTH EVERY DAY       Diuretics/Combination Diuretics Refill Protocol  Failed - 6/8/2020 12:12 AM        Failed - Visit with PCP or prescribing provider visit in past 12 months     Last office visit with prescriber/PCP: 11/9/2017 Jose Maria Mckenzie MD OR same dept: Visit date not found OR same specialty: 11/9/2017 Jose Maria Mckenzie MD  Last physical: 4/23/2019 Last MTM visit: Visit date not found   Next visit within 3 mo: Visit date not found  Next physical within 3 mo: Visit date not found  Prescriber OR PCP: Jose Maria Mckenzie MD  Last diagnosis associated with med order: 1. Benign Essential Hypertension  - lisinopriL-hydrochlorothiazide (PRINZIDE,ZESTORETIC) 20-12.5 mg per tablet [Pharmacy Med Name: LISINOPRIL-HCTZ 20-12.5 MG TAB]; TAKE 2 TABLETS BY MOUTH EVERY DAY  Dispense: 180 tablet; Refill: 0    If protocol passes may refill for 12 months if within 3 months of last provider visit (or a total of 15 months).             Failed - Serum Potassium in past 12 months      No results found for: LN-POTASSIUM          Failed - Serum Sodium in past 12 months      No results found for: LN-SODIUM          Failed - Blood pressure on file in past 12 months     BP Readings from Last 1 Encounters:   04/23/19 (!) 168/108             Failed -  Serum Creatinine in past 12 months      Creatinine   Date Value Ref Range Status   11/09/2017 1.01 0.70 - 1.30 mg/dL Final

## 2021-06-08 NOTE — TELEPHONE ENCOUNTER
RN cannot approve Refill Request    RN can NOT refill this medication Protocol failed and NO refill given.       Helen [place]en[place]ro, [facility] Triage/Med Refill 6/11/2020    Requested Prescriptions   Pending Prescriptions Disp Refills     atorvastatin (LIPITOR) 20 MG tablet [Pharmacy Med Name: ATORVASTATIN 20 MG TABLET] 90 tablet 3     Sig: TAKE 1 TABLET BY MOUTH EVERYDAY AT BEDTIME       Statins Refill Protocol (Hmg CoA Reductase Inhibitors) Failed - 6/10/2020 12:12 AM        Failed - PCP or prescribing provider visit in past 12 months      Last office visit with prescriber/PCP: 11/9/2017 Jose Maria Mckenzie MD OR same dept: Visit date not found OR same specialty: 11/9/2017 Jose Maria Mckenzie MD  Last physical: 4/23/2019 Last MTM visit: Visit date not found   Next visit within 3 mo: Visit date not found  Next physical within 3 mo: Visit date not found  Prescriber OR PCP: Jose Maria Mckenzie MD  Last diagnosis associated with med order: 1. Hyperlipidemia  - atorvastatin (LIPITOR) 20 MG tablet [Pharmacy Med Name: ATORVASTATIN 20 MG TABLET]; TAKE 1 TABLET BY MOUTH EVERYDAY AT BEDTIME  Dispense: 90 tablet; Refill: 3    If protocol passes may refill for 12 months if within 3 months of last provider visit (or a total of 15 months).

## 2021-06-08 NOTE — PROGRESS NOTES
"Provider E-Visit time total (minutes): 7 minutes     3:50 - 3:57 PM patiently currently has hypertension    Medications  Lisinopril hydrochlorothiazide 20/12.52 tablets daily  Apparently blood pressures running high  Has not had a visit since 4/2019  Due for lab testing  Blood pressures have been \"running high    Goal blood pressures  Less than 140 and the top number  Less than 85 and the bottom number    Will add amlodipine 5 mg twice daily  Patient will come in for lab testing complete metabolic profile, PSA, CBC, TSH and Vitamin D      He will also come in and have his blood pressure checked when he is having his blood drawn    "

## 2021-06-09 NOTE — TELEPHONE ENCOUNTER
RN cannot approve Refill Request    RN can NOT refill this medication PCP messaged that patient is overdue for Office Visit. Last office visit: 11/9/2017 Jose Maria Mckenzie MD Last Physical: 4/23/2019 Last MTM visit: Visit date not found Last visit same specialty: 11/9/2017 Jose Maria Mckenzie MD.  Next visit within 3 mo: Visit date not found  Next physical within 3 mo: Visit date not found      Yesenia Mauricio, [facility] Triage/Med Refill 7/5/2020    Requested Prescriptions   Pending Prescriptions Disp Refills     escitalopram oxalate (LEXAPRO) 10 MG tablet [Pharmacy Med Name: ESCITALOPRAM 10 MG TABLET] 90 tablet 0     Sig: TAKE 1/2 TABLET BY MOUTH DAILY FOR 14 DAYS, THEN TAKE 1 TABLET BY MOUTH DAILY FOR ANXIETY       [facility] Refill Protocol  Failed - 7/5/2020 10:34 AM        Failed - PCP or prescribing provider visit in last year     Last office visit with prescriber/PCP: 11/9/2017 Jose Maria Mckenzie MD OR same dept: Visit date not found OR same specialty: 11/9/2017 Jose Maria Mckenzie MD  Last physical: 4/23/2019 Last MTM visit: Visit date not found   Next visit within 3 mo: Visit date not found  Next physical within 3 mo: Visit date not found  Prescriber OR PCP: Jose Maria Mckenzie MD  Last diagnosis associated with med order: 1. Anxiety  - escitalopram oxalate (LEXAPRO) 10 MG tablet [Pharmacy Med Name: ESCITALOPRAM 10 MG TABLET]; TAKE 1/2 TABLET BY MOUTH DAILY FOR 14 DAYS, THEN TAKE 1 TABLET BY MOUTH DAILY FOR ANXIETY  Dispense: 90 tablet; Refill: 0    If protocol passes may refill for 12 months if within 3 months of last provider visit (or a total of 15 months).

## 2021-06-09 NOTE — TELEPHONE ENCOUNTER
RN cannot approve Refill Request    RN can NOT refill this medication PCP messaged that patient is overdue for Office Visit. Last office visit: 11/9/2017 Jose Maria Mckenzie MD Last Physical: 4/23/2019 Last MTM visit: Visit date not found Last visit same specialty: 11/9/2017 Jose Maria Mckenzie MD.  Next visit within 3 mo: Visit date not found  Next physical within 3 mo: Visit date not found      Kathleen Mcfarland, [facility] Triage/Med Refill 7/15/2020    Requested Prescriptions   Pending Prescriptions Disp Refills     sildenafil (REVATIO) 20 mg tablet [Pharmacy Med Name: SILDENAFIL 20 MG TABLET] 90 tablet 0     Sig: TAKE 1 TO 5 TABLETS BY MOUTH 1 HOUR BEFORE SEXUAL ACTIVITY       Medications for Impotence Refill Protocol Failed - 7/15/2020  7:33 PM        Failed - PCP or prescribing provider visit in last year     Last office visit with prescriber/PCP: 11/9/2017 Jose Maria Mckenzie MD OR same dept: Visit date not found OR same specialty: 11/9/2017 Jose Maria Mckenzie MD  Last physical: 4/23/2019 Last MTM visit: Visit date not found   Next visit within 3 mo: Visit date not found  Next physical within 3 mo: Visit date not found  Prescriber OR PCP: Jose Maria Mckenzie MD  Last diagnosis associated with med order: 1. Male Erectile Disorder Due To Physical Condition  - sildenafil (REVATIO) 20 mg tablet [Pharmacy Med Name: SILDENAFIL 20 MG TABLET]; TAKE 1 TO 5 TABLETS BY MOUTH 1 HOUR BEFORE SEXUAL ACTIVITY  Dispense: 90 tablet; Refill: 0    If protocol passes may refill for 12 months if within 3 months of last provider visit (or a total of 15 months).

## 2021-06-10 NOTE — TELEPHONE ENCOUNTER
Left message to call back for: Patient  Information to relay to patient:  Please let pt know that he is due for a visit for his BP medication and labwork.  Please assist in getting an appt scheduled with Dr. Mckenzie and he can send in a refill to get him to his appointment. Thanks!      XAVIER/MANJEET

## 2021-06-10 NOTE — TELEPHONE ENCOUNTER
Medication Request  Medication name: LISINOPRIL-HCTZ 20-12.5 MG TAB  Requested Pharmacy: [facility]  Reason for request: Electronic request  When did you use medication last?:  N/A  Patient offered appointment:  N/A - electronic request  Okay to leave a detailed message: no

## 2021-06-10 NOTE — TELEPHONE ENCOUNTER
Attempt #3  Left message to call back for: Patient  Information to relay to patient:  Please let pt know that he is due for a visit for his BP medication and labwork.  Please assist in getting an appt scheduled with Dr. Mckenzie and he can send in a refill to get him to his appointment. Thanks!

## 2021-06-10 NOTE — TELEPHONE ENCOUNTER
Left message to call back for: Patient  Information to relay to patient:  Please let pt know that he is due for a visit for his BP medication and labwork.  Please assist in getting an appt scheduled with Dr. Mckenzie and he can send in a refill to get him to his appointment. Thanks!

## 2021-06-10 NOTE — TELEPHONE ENCOUNTER
RN cannot approve Refill Request    RN can NOT refill this medication Protocol failed and NO refill given. Last office visit: 11/9/2017 Jose Maria Mckenzie MD Last Physical: 4/23/2019 Last MTM visit: Visit date not found Last visit same specialty: 11/9/2017 Jose Maria Mckenzie MD.  Next visit within 3 mo: Visit date not found  Next physical within 3 mo: Visit date not found      Helen [place]en[place], [facility] Triage/Med Refill 7/27/2020    Requested Prescriptions   Pending Prescriptions Disp Refills     lisinopriL-hydrochlorothiazide (PRINZIDE,ZESTORETIC) 20-12.5 mg per tablet [Pharmacy Med Name: LISINOPRIL-HCTZ 20-12.5 MG TAB] 90 tablet 3     Sig: TAKE 2 TABLETS BY MOUTH EVERY DAY       Diuretics/Combination Diuretics Refill Protocol  Failed - 7/25/2020 12:36 PM        Failed - Visit with PCP or prescribing provider visit in past 12 months     Last office visit with prescriber/PCP: 11/9/2017 Jose Maria Mckenzie MD OR same dept: Visit date not found OR same specialty: 11/9/2017 Jose Maria Mckenzie MD  Last physical: 4/23/2019 Last MTM visit: Visit date not found   Next visit within 3 mo: Visit date not found  Next physical within 3 mo: Visit date not found  Prescriber OR PCP: Jose Maria Mckenzie MD  Last diagnosis associated with med order: 1. Benign Essential Hypertension  - lisinopriL-hydrochlorothiazide (PRINZIDE,ZESTORETIC) 20-12.5 mg per tablet [Pharmacy Med Name: LISINOPRIL-HCTZ 20-12.5 MG TAB]; TAKE 2 TABLETS BY MOUTH EVERY DAY  Dispense: 90 tablet; Refill: 0    If protocol passes may refill for 12 months if within 3 months of last provider visit (or a total of 15 months).             Failed - Serum Potassium in past 12 months      No results found for: LN-POTASSIUM          Failed - Serum Sodium in past 12 months      No results found for: LN-SODIUM          Failed - Blood pressure on file in past 12 months     BP Readings from Last 1 Encounters:   04/23/19 (!) 168/108             Failed - Serum Creatinine in past 12  months      Creatinine   Date Value Ref Range Status   11/09/2017 1.01 0.70 - 1.30 mg/dL Final

## 2021-06-10 NOTE — TELEPHONE ENCOUNTER
RN cannot approve Refill Request    RN can NOT refill this medication Protocol failed and NO refill given. Last office visit: 11/9/2017 Jose Maria Mckenzie MD Last Physical: 4/23/2019 Last MTM visit: Visit date not found Last visit same specialty: 11/9/2017 Jose Maria Mckenzie MD.  Next visit within 3 mo: Visit date not found  Next physical within 3 mo: Visit date not found      Helen Monten[place]ro, [facility] Connection Triage/Med Refill 8/10/2020    Requested Prescriptions   Pending Prescriptions Disp Refills     escitalopram oxalate (LEXAPRO) 10 MG tablet 15 tablet 0     Sig: TAKE 1 TABLET BY MOUTH DAILY FOR ANXIETY       [facility] Refill Protocol  Failed - 8/9/2020  3:40 PM        Failed - PCP or prescribing provider visit in last year     Last office visit with prescriber/PCP: 11/9/2017 Jose Maria Mckenzie MD OR same dept: Visit date not found OR same specialty: 11/9/2017 Jose Maria Mckenzie MD  Last physical: 4/23/2019 Last MTM visit: Visit date not found   Next visit within 3 mo: Visit date not found  Next physical within 3 mo: Visit date not found  Prescriber OR PCP: Jose Maria Mckenzie MD  Last diagnosis associated with med order: 1. Anxiety  - escitalopram oxalate (LEXAPRO) 10 MG tablet; TAKE 1 TABLET BY MOUTH DAILY FOR ANXIETY  Dispense: 15 tablet; Refill: 0    If protocol passes may refill for 12 months if within 3 months of last provider visit (or a total of 15 months).

## 2021-06-10 NOTE — TELEPHONE ENCOUNTER
RN cannot approve Refill Request    RN can NOT refill this medication PCP messaged that patient is overdue for Office Visit. Last office visit: 11/9/2017 Jose Maria Mckenzie MD Last Physical: 4/23/2019 Last MTM visit: Visit date not found Last visit same specialty: 11/9/2017 Jose Maria Mckenzie MD.  Next visit within 3 mo: Visit date not found  Next physical within 3 mo: Visit date not found      Yesenia Mauricio, [facility] Triage/Med Refill 8/9/2020    Requested Prescriptions   Pending Prescriptions Disp Refills     sildenafil (REVATIO) 20 mg tablet [Pharmacy Med Name: SILDENAFIL 20 MG TABLET] 90 tablet 0     Sig: TAKE 1 TO 5 TABLETS BY MOUTH 1 HOUR BEFORE SEXUAL ACTIVITY--MAKE APPT       Medications for Impotence Refill Protocol Failed - 8/8/2020  6:51 PM        Failed - PCP or prescribing provider visit in last year     Last office visit with prescriber/PCP: 11/9/2017 Jose Maria Mckenzie MD OR same dept: Visit date not found OR same specialty: 11/9/2017 Jose Maria Mckenzie MD  Last physical: 4/23/2019 Last MTM visit: Visit date not found   Next visit within 3 mo: Visit date not found  Next physical within 3 mo: Visit date not found  Prescriber OR PCP: Jose Maria Mckenzie MD  Last diagnosis associated with med order: 1. Male Erectile Disorder Due To Physical Condition  - sildenafil (REVATIO) 20 mg tablet [Pharmacy Med Name: SILDENAFIL 20 MG TABLET]; TAKE 1 TO 5 TABLETS BY MOUTH 1 HOUR BEFORE SEXUAL ACTIVITY--MAKE APPT  Dispense: 90 tablet; Refill: 0    2. Anxiety  - escitalopram oxalate (LEXAPRO) 10 MG tablet [Pharmacy Med Name: ESCITALOPRAM 10 MG TABLET]; TAKE 1 TABLET BY MOUTH DAILY FOR ANXIETY. NEED APPT FOR FURTHER REFILLS.  Dispense: 15 tablet; Refill: 0    If protocol passes may refill for 12 months if within 3 months of last provider visit (or a total of 15 months).                escitalopram oxalate (LEXAPRO) 10 MG tablet [Pharmacy Med Name: ESCITALOPRAM 10 MG TABLET] 15 tablet 0     Sig: TAKE 1 TABLET BY  MOUTH DAILY FOR ANXIETY. NEED APPT FOR FURTHER REFILLS.       [facility] Refill Protocol  Failed - 8/8/2020  6:51 PM        Failed - PCP or prescribing provider visit in last year     Last office visit with prescriber/PCP: 11/9/2017 Jose Maria Mckenzie MD OR same dept: Visit date not found OR same specialty: 11/9/2017 Jose Maria Mckenzie MD  Last physical: 4/23/2019 Last MTM visit: Visit date not found   Next visit within 3 mo: Visit date not found  Next physical within 3 mo: Visit date not found  Prescriber OR PCP: Jose Maria Mckenzie MD  Last diagnosis associated with med order: 1. Male Erectile Disorder Due To Physical Condition  - sildenafil (REVATIO) 20 mg tablet [Pharmacy Med Name: SILDENAFIL 20 MG TABLET]; TAKE 1 TO 5 TABLETS BY MOUTH 1 HOUR BEFORE SEXUAL ACTIVITY--MAKE APPT  Dispense: 90 tablet; Refill: 0    2. Anxiety  - escitalopram oxalate (LEXAPRO) 10 MG tablet [Pharmacy Med Name: ESCITALOPRAM 10 MG TABLET]; TAKE 1 TABLET BY MOUTH DAILY FOR ANXIETY. NEED APPT FOR FURTHER REFILLS.  Dispense: 15 tablet; Refill: 0    If protocol passes may refill for 12 months if within 3 months of last provider visit (or a total of 15 months).

## 2021-06-11 NOTE — TELEPHONE ENCOUNTER
Refill Approved    Rx renewed per Medication Renewal Policy. Medication was last renewed on 6/11/20.    Helen Montenegro, [facility] Triage/Med Refill 9/1/2020     Requested Prescriptions   Pending Prescriptions Disp Refills     atorvastatin (LIPITOR) 20 MG tablet [Pharmacy Med Name: ATORVASTATIN 20 MG TABLET] 90 tablet 0     Sig: TAKE 1 TABLET BY MOUTH EVERYDAY AT BEDTIME       Statins Refill Protocol (Hmg CoA Reductase Inhibitors) Passed - 8/29/2020  2:38 PM        Passed - PCP or prescribing provider visit in past 12 months      Last office visit with prescriber/PCP: 11/9/2017 Jose Maria Mckenzie MD OR same dept: Visit date not found OR same specialty: 11/9/2017 Jose Maria Mckenzie MD  Last physical: 4/23/2019 Last MTM visit: Visit date not found   Next visit within 3 mo: Visit date not found  Next physical within 3 mo: Visit date not found  Prescriber OR PCP: Jose Maria Mckenzie MD  Last diagnosis associated with med order: 1. Hyperlipidemia  - atorvastatin (LIPITOR) 20 MG tablet [Pharmacy Med Name: ATORVASTATIN 20 MG TABLET]; TAKE 1 TABLET BY MOUTH EVERYDAY AT BEDTIME  Dispense: 90 tablet; Refill: 0    If protocol passes may refill for 12 months if within 3 months of last provider visit (or a total of 15 months).

## 2021-06-12 NOTE — TELEPHONE ENCOUNTER
Refill Approved    Rx renewed per Medication Renewal Policy. Medication was last renewed on 8/11/20.    Helen Montenegro, [facility] Connection Triage/Med Refill 11/5/2020     Requested Prescriptions   Pending Prescriptions Disp Refills     escitalopram oxalate (LEXAPRO) 10 MG tablet [Pharmacy Med Name: ESCITALOPRAM 10 MG TABLET] 90 tablet 0     Sig: TAKE 1 TABLET BY MOUTH DAILY FOR ANXIETY. NEED APPT FOR FURTHER REFILLS.       [facility] Refill Protocol  Passed - 11/3/2020 12:30 PM        Passed - PCP or prescribing provider visit in last year     Last office visit with prescriber/PCP: 11/9/2017 Jose Maria Mckenzie MD OR same dept: Visit date not found OR same specialty: 11/9/2017 Jose Maria Mckenzie MD  Last physical: 4/23/2019 Last MTM visit: Visit date not found   Next visit within 3 mo: Visit date not found  Next physical within 3 mo: Visit date not found  Prescriber OR PCP: Jose Maria Mckenzie MD  Last diagnosis associated with med order: 1. Anxiety  - escitalopram oxalate (LEXAPRO) 10 MG tablet [Pharmacy Med Name: ESCITALOPRAM 10 MG TABLET]; TAKE 1 TABLET BY MOUTH DAILY FOR ANXIETY. NEED APPT FOR FURTHER REFILLS.  Dispense: 90 tablet; Refill: 0    If protocol passes may refill for 12 months if within 3 months of last provider visit (or a total of 15 months).

## 2021-06-13 NOTE — TELEPHONE ENCOUNTER
RN cannot approve Refill Request    RN can NOT refill this medication PCP messaged that patient is overdue for Labs. Last office visit: 11/9/2017 Jose Maria Mckenzie MD Last Physical: 4/23/2019 Last MTM visit: Visit date not found Last visit same specialty: 11/9/2017 Jose Maria Mckenzie MD.  Next visit within 3 mo: Visit date not found  Next physical within 3 mo: Visit date not found      Yesenia Mauricio, [facility] Triage/Med Refill 11/14/2020    Requested Prescriptions   Pending Prescriptions Disp Refills     lisinopriL-hydrochlorothiazide (PRINZIDE,ZESTORETIC) 20-12.5 mg per tablet [Pharmacy Med Name: LISINOPRIL-HCTZ 20-12.5 MG TAB] 180 tablet 0     Sig: TAKE 2 TABLETS BY MOUTH EVERY DAY       Diuretics/Combination Diuretics Refill Protocol  Failed - 11/13/2020 12:35 PM        Failed - Serum Potassium in past 12 months      No results found for: LN-POTASSIUM          Failed - Serum Sodium in past 12 months      No results found for: LN-SODIUM          Failed - Blood pressure on file in past 12 months     BP Readings from Last 1 Encounters:   04/23/19 (!) 168/108             Failed - Serum Creatinine in past 12 months      Creatinine   Date Value Ref Range Status   11/09/2017 1.01 0.70 - 1.30 mg/dL Final             Passed - Visit with PCP or prescribing provider visit in past 12 months     Last office visit with prescriber/PCP: 11/9/2017 Jose Maria Mckenzie MD OR same dept: Visit date not found OR same specialty: 11/9/2017 Jose Maria Mckenzie MD  Last physical: 4/23/2019 Last MTM visit: Visit date not found   Next visit within 3 mo: Visit date not found  Next physical within 3 mo: Visit date not found  Prescriber OR PCP: Jose Maria Mckenzie MD  Last diagnosis associated with med order: 1. Benign Essential Hypertension  - lisinopriL-hydrochlorothiazide (PRINZIDE,ZESTORETIC) 20-12.5 mg per tablet [Pharmacy Med Name: LISINOPRIL-HCTZ 20-12.5 MG TAB]; TAKE 2 TABLETS BY MOUTH EVERY DAY  Dispense: 180 tablet; Refill: 0    If  protocol passes may refill for 12 months if within 3 months of last provider visit (or a total of 15 months).

## 2021-06-14 NOTE — PROGRESS NOTES
ASSESSMENT & PLAN      Raji was seen today for annual exam.    Diagnoses and all orders for this visit:    Benign Essential Hypertension    Dental caries    Well adult exam  -     NMR with Lipid  -     Comprehensive Metabolic Panel  -     HM1(CBC and Differential)  -     Urinalysis-UC if Indicated  -     C -Reactive Protein, High Sensitivity  -     HM1 (CBC with Diff)    Screening for prostate cancer  -     PSA (Prostatic-Specific Antigen), Annual Screen    Vitamin D deficiency disease  -     Vitamin D, Total (25-Hydroxy)    Other orders  -     Tdap vaccine greater than or equal to 8yo IM  -     Influenza, Seasonal Quad, Preservative Free 36+ Months  -     Varicella-zoster vaccine subq      No Follow-up on file.           CHIEF COMPLAINT: Raji Chahal had concerns including Annual Exam.    [place]: 1.............. had concerns including Annual Exam.    1. Benign Essential Hypertension    2. Dental caries    3. Well adult exam    4. Screening for prostate cancer    5. Vitamin D deficiency disease      No problem-specific Assessment & Plan notes found for this encounter.      CC:              Physical. Pt is fasting. No pain today. Pt would like to discuss the shots he is due for.        Cologard    PSA  2015    Shots    Plantar  Better    Left Arthritis    Hypertension  No: Skin prostate cancer  He is retired  Staying active  Every other day exercise          SUBJECTIVE:  Raji Chahal is a 62 y.o. male    Past Medical History:   Diagnosis Date     Hypertension      Low back pain      Varicose vein      Past Surgical History:   Procedure Laterality Date     CYSTOSCOPY       Penicillins  Current Outpatient Prescriptions   Medication Sig Dispense Refill     aspirin 81 mg TbEF Take 1 tablet by mouth daily.       atorvastatin (LIPITOR) 20 MG tablet TAKE ONE TABLET BY MOUTH NIGHTLY AT BEDTIME 90 tablet 3     CIALIS 5 mg tablet TAKE ONE TABLET BY MOUTH ONE TIME DAILY AS NEEDED FOR ERECTILE DYSFUNCTION 30 tablet 3      lisinopril-hydrochlorothiazide (PRINZIDE,ZESTORETIC) 20-25 mg per tablet Take 1 tablet by mouth daily. 90 tablet 1     omega-3 fatty acids-vitamin E (FISH OIL) 1,000 mg cap 2 Capsules Daily 60 each 12     acetylcarnitine 500 mg cap 500 MG  2 CAPSULES DAILY 60 capsule 12     alpha lipoic acid 300 mg cap 2 AM 1 PM 90 each 12     coenzyme Q10 200 mg capsule 1 capsule daily 30 capsule 12     HYDROcodone-acetaminophen (NORCO) 7.5-325 mg per tablet Take 1-2 tablets by mouth every 6 (six) hours as needed for pain. 30 tablet 0     multivitamin capsule Take 1 capsule by mouth daily.       resveratrol 250 mg cap Take 250 mg by mouth daily. 30 capsule 12     selenium 200 mcg cap 1 CAPSULE DAILY 30 each 12     No current facility-administered medications for this visit.      Family History   Problem Relation Age of Onset     Hypertension Mother      Diabetes Father      Cancer Sister      Social History     Social History     Marital status:      Spouse name: N/A     Number of children: N/A     Years of education: N/A     Social History Main Topics     Smoking status: Former Smoker     Packs/day: 0.50     Years: 10.00     Types: Cigarettes     Smokeless tobacco: Former User     Alcohol use 1.2 oz/week     2 Cans of beer per week     Drug use: Yes     Special: Marijuana     Sexual activity: Yes     Partners: Female     Birth control/ protection: None     Other Topics Concern     None     Social History Narrative     Patient Active Problem List   Diagnosis     Benign Essential Hypertension     Male Erectile Disorder Due To Physical Condition     Hypercholesterolemia     Lumbar Strain     Dental caries                                              SOCIAL: He  reports that he has quit smoking. His smoking use included Cigarettes. He has a 5.00 pack-year smoking history. He has quit using smokeless tobacco. He reports that he drinks about 1.2 oz of alcohol per week  He reports that he uses illicit drugs, including  "Marijuana.    REVIEW OF SYSTEMS:   Family history not pertinent to chief complaint or presenting problem    Review of systems otherwise negative as requested from patient, except   Those positive ROS outlined and discussed in [place].    OBJECTIVE:  /68  Pulse (!) 58  Resp 12  Ht 5' 10.5\" (1.791 m)  Wt 212 lb (96.2 kg)  SpO2 98%  BMI 29.99 kg/m2    GENERAL:     No acute distress.   Alert and oriented X 3         Physical:    Sclera clear  TMs clear  Oropharynx shows dental caries upper or lower on the right  No cervical or subclavicular nodes  Nasal mucosa is clear  Lungs are clear  Carotid pulses are full without bruits  Abdomen soft flat nontender with positive bowel sounds no organ enlargement  Prostate smooth nontender without nodules  Varicose veins lower extremities  Palpable distal pulses        ASSESSMENT & PLAN      Raji was seen today for annual exam.    Diagnoses and all orders for this visit:    Benign Essential Hypertension    Dental caries    Well adult exam  -     NMR with Lipid  -     Comprehensive Metabolic Panel  -     HM1(CBC and Differential)  -     Urinalysis-UC if Indicated  -     C -Reactive Protein, High Sensitivity  -     HM1 (CBC with Diff)    Screening for prostate cancer  -     PSA (Prostatic-Specific Antigen), Annual Screen    Vitamin D deficiency disease  -     Vitamin D, Total (25-Hydroxy)    Other orders  -     Tdap vaccine greater than or equal to 8yo IM  -     Influenza, Seasonal Quad, Preservative Free 36+ Months  -     Varicella-zoster vaccine subq      No Follow-up on file.       Anticipatory Guidance and Symptomatic Cares Discussed   Advised to call back directly if there are further questions, or if these symptoms fail to improve as anticipated or worsen.  Return to clinic if patient has a clinical concern that warrants an exam.        25  Min Total Time, > 50% counseling and coordination of Care    Jose Maria Mckenzie MD  Family Medicine   [facility] " [facility] [address]  [phone]

## 2021-06-15 NOTE — TELEPHONE ENCOUNTER
RN cannot approve Refill Request    RN can NOT refill this medication Protocol failed and NO refill given. Last office visit: 11/9/2017 Jose Maria Mckenzie MD Last Physical: 4/23/2019 Last MTM visit: Visit date not found Last visit same specialty: Visit date not found.  Next visit within 3 mo: Visit date not found  Next physical within 3 mo: Visit date not found      Luke BENAVIDEZ Mateo, [facility] Triage/Med Refill 2/8/2021    Requested Prescriptions   Pending Prescriptions Disp Refills     lisinopriL-hydrochlorothiazide (PRINZIDE,ZESTORETIC) 20-12.5 mg per tablet 30 tablet 0     Sig: Take 2 tablets by mouth daily.       Diuretics/Combination Diuretics Refill Protocol  Failed - 2/8/2021  2:28 PM        Failed - Serum Potassium in past 12 months      No results found for: LN-POTASSIUM          Failed - Serum Sodium in past 12 months      No results found for: LN-SODIUM          Failed - Blood pressure on file in past 12 months     BP Readings from Last 1 Encounters:   04/23/19 (!) 168/108             Failed - Serum Creatinine in past 12 months      Creatinine   Date Value Ref Range Status   11/09/2017 1.01 0.70 - 1.30 mg/dL Final             Passed - Visit with PCP or prescribing provider visit in past 12 months     Last office visit with prescriber/PCP: 11/9/2017 Jose Maria Mckenzie MD OR same dept: Visit date not found OR same specialty: Visit date not found  Last physical: 4/23/2019 Last MTM visit: Visit date not found   Next visit within 3 mo: Visit date not found  Next physical within 3 mo: Visit date not found  Prescriber OR PCP: Jose Maria Mckenzie MD  Last diagnosis associated with med order: 1. Benign Essential Hypertension  - lisinopriL-hydrochlorothiazide (PRINZIDE,ZESTORETIC) 20-12.5 mg per tablet; Take 2 tablets by mouth daily.  Dispense: 60 tablet; Refill: 3  - lisinopriL-hydrochlorothiazide (PRINZIDE,ZESTORETIC) 20-12.5 mg per tablet; Take 2 tablets by mouth daily.  Dispense: 30 tablet; Refill: 0    If  protocol passes may refill for 12 months if within 3 months of last provider visit (or a total of 15 months).              Refused Prescriptions Disp Refills     lisinopriL-hydrochlorothiazide (PRINZIDE,ZESTORETIC) 20-12.5 mg per tablet 60 tablet 3     Sig: Take 2 tablets by mouth daily.       Diuretics/Combination Diuretics Refill Protocol  Failed - 2/8/2021  2:28 PM        Failed - Serum Potassium in past 12 months      No results found for: LN-POTASSIUM          Failed - Serum Sodium in past 12 months      No results found for: LN-SODIUM          Failed - Blood pressure on file in past 12 months     BP Readings from Last 1 Encounters:   04/23/19 (!) 168/108             Failed - Serum Creatinine in past 12 months      Creatinine   Date Value Ref Range Status   11/09/2017 1.01 0.70 - 1.30 mg/dL Final             Passed - Visit with PCP or prescribing provider visit in past 12 months     Last office visit with prescriber/PCP: 11/9/2017 Jose Maria Mckenzie MD OR same dept: Visit date not found OR same specialty: Visit date not found  Last physical: 4/23/2019 Last MTM visit: Visit date not found   Next visit within 3 mo: Visit date not found  Next physical within 3 mo: Visit date not found  Prescriber OR PCP: Jose Maria Mckenzie MD  Last diagnosis associated with med order: 1. Benign Essential Hypertension  - lisinopriL-hydrochlorothiazide (PRINZIDE,ZESTORETIC) 20-12.5 mg per tablet; Take 2 tablets by mouth daily.  Dispense: 60 tablet; Refill: 3  - lisinopriL-hydrochlorothiazide (PRINZIDE,ZESTORETIC) 20-12.5 mg per tablet; Take 2 tablets by mouth daily.  Dispense: 30 tablet; Refill: 0    If protocol passes may refill for 12 months if within 3 months of last provider visit (or a total of 15 months).

## 2021-06-15 NOTE — TELEPHONE ENCOUNTER
RN cannot approve Refill Request    RN can NOT refill this medication PCP messaged that patient is overdue for Labs. Last office visit: 11/9/2017 Jose Maria Mckenzie MD Last Physical: 4/23/2019 Last MTM visit: Visit date not found Last visit same specialty: Visit date not found.  Next visit within 3 mo: Visit date not found  Next physical within 3 mo: Visit date not found      Yesenia Mauricio, [facility] Triage/Med Refill 2/4/2021    Requested Prescriptions   Pending Prescriptions Disp Refills     lisinopriL-hydrochlorothiazide (PRINZIDE,ZESTORETIC) 20-12.5 mg per tablet 60 tablet 3     Sig: Take 2 tablets by mouth daily.       Diuretics/Combination Diuretics Refill Protocol  Failed - 2/4/2021 12:59 PM        Failed - Serum Potassium in past 12 months      No results found for: LN-POTASSIUM          Failed - Serum Sodium in past 12 months      No results found for: LN-SODIUM          Failed - Blood pressure on file in past 12 months     BP Readings from Last 1 Encounters:   04/23/19 (!) 168/108             Failed - Serum Creatinine in past 12 months      Creatinine   Date Value Ref Range Status   11/09/2017 1.01 0.70 - 1.30 mg/dL Final             Passed - Visit with PCP or prescribing provider visit in past 12 months     Last office visit with prescriber/PCP: 11/9/2017 Jose Maria Mckenzie MD OR same dept: Visit date not found OR same specialty: Visit date not found  Last physical: 4/23/2019 Last MTM visit: Visit date not found   Next visit within 3 mo: Visit date not found  Next physical within 3 mo: Visit date not found  Prescriber OR PCP: Jose Maria Mckenzie MD  Last diagnosis associated with med order: 1. Benign Essential Hypertension  - lisinopriL-hydrochlorothiazide (PRINZIDE,ZESTORETIC) 20-12.5 mg per tablet; Take 2 tablets by mouth daily.  Dispense: 60 tablet; Refill: 3    If protocol passes may refill for 12 months if within 3 months of last provider visit (or a total of 15 months).

## 2021-06-15 NOTE — TELEPHONE ENCOUNTER
Please contact Raji he needs laboratory values a blood pressure check an appointment  I can do a virtual visit if he is concerned about coming in but he should do laboratory testing    Sharad

## 2021-06-15 NOTE — TELEPHONE ENCOUNTER
Left message to call back for: Raji  Information to relay to patient:  Needs to make an appointment    Evelin Mishra CMA ([facility])

## 2021-06-15 NOTE — TELEPHONE ENCOUNTER
Left message to call back for: Raji  Information to relay to patient: Dr. Mckenzie denied refill of lisinopril because patient needs an office visit and lab work.  Please set up appointment.

## 2021-06-16 PROBLEM — F41.9 ANXIETY: Status: ACTIVE | Noted: 2019-04-23

## 2021-06-16 PROBLEM — R31.29 MICROSCOPIC HEMATURIA: Status: ACTIVE | Noted: 2019-04-24

## 2021-06-16 NOTE — TELEPHONE ENCOUNTER
RN cannot approve Refill Request    RN can NOT refill this medication PCP messaged that patient is overdue for Labs. Last office visit: 11/9/2017 Jose Maria Mckenzie MD Last Physical: 4/23/2019 Last MTM visit: Visit date not found Last visit same specialty: Visit date not found.  Next visit within 3 mo: Visit date not found  Next physical within 3 mo: Visit date not found      Yesenia Mauricio, [facility] Triage/Med Refill 4/16/2021    Requested Prescriptions   Pending Prescriptions Disp Refills     lisinopriL-hydrochlorothiazide (PRINZIDE,ZESTORETIC) 20-12.5 mg per tablet [Pharmacy Med Name: LISINOPRIL-HCTZ 20-12.5 MG TAB] 30 tablet 0     Sig: TAKE 2 TABLETS BY MOUTH EVERY DAY       Diuretics/Combination Diuretics Refill Protocol  Failed - 4/16/2021 11:55 AM        Failed - Serum Potassium in past 12 months      No results found for: LN-POTASSIUM          Failed - Serum Sodium in past 12 months      No results found for: LN-SODIUM          Failed - Blood pressure on file in past 12 months     BP Readings from Last 1 Encounters:   04/23/19 (!) 168/108             Failed - Serum Creatinine in past 12 months      Creatinine   Date Value Ref Range Status   11/09/2017 1.01 0.70 - 1.30 mg/dL Final             Passed - Visit with PCP or prescribing provider visit in past 12 months     Last office visit with prescriber/PCP: 11/9/2017 Jose Maria Mckenzie MD OR same dept: Visit date not found OR same specialty: Visit date not found  Last physical: 4/23/2019 Last MTM visit: Visit date not found   Next visit within 3 mo: Visit date not found  Next physical within 3 mo: Visit date not found  Prescriber OR PCP: Jose Maria Mckenzie MD  Last diagnosis associated with med order: 1. Benign Essential Hypertension  - lisinopriL-hydrochlorothiazide (PRINZIDE,ZESTORETIC) 20-12.5 mg per tablet [Pharmacy Med Name: LISINOPRIL-HCTZ 20-12.5 MG TAB]; TAKE 2 TABLETS BY MOUTH EVERY DAY  Dispense: 30 tablet; Refill: 0    If protocol passes may  refill for 12 months if within 3 months of last provider visit (or a total of 15 months).

## 2021-06-16 NOTE — TELEPHONE ENCOUNTER
RN cannot approve Refill Request    RN can NOT refill this medication PCP messaged that patient is overdue for Labs and BP. Last office visit: 11/9/2017 Jose Maria Mckenzie MD Last Physical: 4/23/2019 Last MTM visit: Visit date not found Last visit same specialty: 11/9/2017 Jose Maria Mckenzie MD.  Next visit within 3 mo: Visit date not found  Next physical within 3 mo: Visit date not found      Hermila Smith, [facility] Triage/Med Refill 3/31/2021    Requested Prescriptions   Pending Prescriptions Disp Refills     lisinopriL-hydrochlorothiazide (PRINZIDE,ZESTORETIC) 20-12.5 mg per tablet [Pharmacy Med Name: LISINOPRIL-HCTZ 20-12.5 MG TAB] 60 tablet 0     Sig: TAKE 2 TABLETS BY MOUTH EVERY DAY       Diuretics/Combination Diuretics Refill Protocol  Failed - 3/31/2021  2:06 PM        Failed - Serum Potassium in past 12 months      No results found for: LN-POTASSIUM          Failed - Serum Sodium in past 12 months      No results found for: LN-SODIUM          Failed - Blood pressure on file in past 12 months     BP Readings from Last 1 Encounters:   04/23/19 (!) 168/108             Failed - Serum Creatinine in past 12 months      Creatinine   Date Value Ref Range Status   11/09/2017 1.01 0.70 - 1.30 mg/dL Final             Passed - Visit with PCP or prescribing provider visit in past 12 months     Last office visit with prescriber/PCP: 11/9/2017 Jose Maria Mckenzie MD OR same dept: Visit date not found OR same specialty: 11/9/2017 Jose Maria Mckenzie MD  Last physical: 4/23/2019 Last MTM visit: Visit date not found   Next visit within 3 mo: Visit date not found  Next physical within 3 mo: Visit date not found  Prescriber OR PCP: Jose Maria Mckenzie MD  Last diagnosis associated with med order: 1. Benign Essential Hypertension  - lisinopriL-hydrochlorothiazide (PRINZIDE,ZESTORETIC) 20-12.5 mg per tablet [Pharmacy Med Name: LISINOPRIL-HCTZ 20-12.5 MG TAB]; TAKE 2 TABLETS BY MOUTH EVERY DAY  Dispense: 60 tablet; Refill:  0    If protocol passes may refill for 12 months if within 3 months of last provider visit (or a total of 15 months).

## 2021-06-19 NOTE — LETTER
Letter by Jose Maria Mckenzie MD at      Author: Jose Maria Mckenzie MD Service: -- Author Type: --    Filed:  Encounter Date: 5/8/2019 Status: (Other)        [facility] FAMILY MEDICINE/OB  [address] [address]  [phone]         Raji Chahal  [address]  [place] [address]        05/08/19    Dear Raji Chahal,     At [facility] we care about your health and well-being. Your primary care provider is committed to ensuring you receive high quality care and has chosen a network of specialists to assist in providing that care. Recently Dr Mckenzie referred you to Dr Torres for specialty care.        It is important to your overall health to follow through with the recommendation from your provider. Please call [phone] at your earliest convenience for assistance in scheduling an appointment.  If you have already scheduled this appointment, please disregard this notice.  Thank you for choosing [facility] for your healthcare needs.       Sincerely,       [facility] Specialty Scheduling

## 2021-06-23 NOTE — TELEPHONE ENCOUNTER
RN cannot approve Refill Request    RN can NOT refill this medication PCP messaged that patient is overdue for Office Visit.    Helen Montenegro, [facility] Triage/Med Refill 1/11/2019    Requested Prescriptions   Pending Prescriptions Disp Refills     tadalafil (CIALIS) 5 MG tablet [Pharmacy Med Name: TADALAFIL 5 MG TABLET] 30 tablet 2     Sig: TAKE 1 TABLET BY MOUTH EVERY DAY AS NEEDED FOR ERECTILE DYSFUNCTION    Medications for Impotence Refill Protocol Failed - 1/10/2019 11:44 AM       Failed - PCP or prescribing provider visit in last year    Last office visit with prescriber/PCP: 11/9/2017 Jose Maria Mckenzie MD OR same dept: Visit date not found OR same specialty: 11/9/2017 Jose Maria Mckenzie MD  Last physical: 1/11/2016 Last MTM visit: Visit date not found   Next visit within 3 mo: Visit date not found  Next physical within 3 mo: Visit date not found  Prescriber OR PCP: Jose Maria Mckenzie MD  Last diagnosis associated with med order: 1. Male Erectile Disorder Due To Physical Condition  - tadalafil (CIALIS) 5 MG tablet [Pharmacy Med Name: TADALAFIL 5 MG TABLET]; TAKE 1 TABLET BY MOUTH EVERY DAY AS NEEDED FOR ERECTILE DYSFUNCTION  Dispense: 30 tablet; Refill: 2    If protocol passes may refill for 12 months if within 3 months of last provider visit (or a total of 15 months).

## 2021-06-23 NOTE — TELEPHONE ENCOUNTER
Pt requesting that we fax guidelines for application for UDN (Undiagnosed Disease Network) to Dr Lela Whitman so that he can write a letter to go with her application to enroll in the program  Same done  Pt tolerated treatment well  Discharged in satisfactory condition  Next appointment 1/20/2020  RN cannot approve Refill Request    RN can NOT refill this medication PCP messaged that patient is overdue for Office Visit.       Helen Montenegro, [facility] Triage/Med Refill 2/4/2019    Requested Prescriptions   Pending Prescriptions Disp Refills     tadalafil (CIALIS) 5 MG tablet [Pharmacy Med Name: TADALAFIL 5 MG TABLET] 30 tablet 2     Sig: TAKE 1 TABLET BY MOUTH EVERY DAY AS NEEDED FOR ERECTILE DYSFUNCTION    Medications for Impotence Refill Protocol Failed - 2/2/2019 11:44 AM       Failed - PCP or prescribing provider visit in last year    Last office visit with prescriber/PCP: 11/9/2017 Jose Maria Mckenzie MD OR same dept: Visit date not found OR same specialty: 11/9/2017 Jose Maria Mckenzie MD  Last physical: 1/11/2016 Last MTM visit: Visit date not found   Next visit within 3 mo: Visit date not found  Next physical within 3 mo: Visit date not found  Prescriber OR PCP: Jose Maria Mckenzie MD  Last diagnosis associated with med order: 1. Male Erectile Disorder Due To Physical Condition  - tadalafil (CIALIS) 5 MG tablet [Pharmacy Med Name: TADALAFIL 5 MG TABLET]; TAKE 1 TABLET BY MOUTH EVERY DAY AS NEEDED FOR ERECTILE DYSFUNCTION  Dispense: 30 tablet; Refill: 2    If protocol passes may refill for 12 months if within 3 months of last provider visit (or a total of 15 months).

## 2021-06-23 NOTE — TELEPHONE ENCOUNTER
RN cannot approve Refill Request    RN can NOT refill this medication Protocol failed and NO refill given. Last office visit: Visit date not found Last Physical: Visit date not found Last MTM visit: Visit date not found Last visit same specialty: 11/9/2017 Jose Maria Mckenzie MD.  Next visit within 3 mo: Visit date not found  Next physical within 3 mo: Visit date not found      Celena Oleary, [facility] Connection Triage/Med Refill 1/15/2019    Requested Prescriptions   Pending Prescriptions Disp Refills     atorvastatin (LIPITOR) 20 MG tablet [Pharmacy Med Name: ATORVASTATIN 20 MG TABLET] 90 tablet 2     Sig: TAKE 1 TABLET BY MOUTH EVERYDAY AT BEDTIME    Statins Refill Protocol (Hmg CoA Reductase Inhibitors) Failed - 1/15/2019  1:29 AM       Failed - PCP or prescribing provider visit in past 12 months     Last office visit with prescriber/PCP: Visit date not found OR same dept: Visit date not found OR same specialty: 11/9/2017 Jose Maria Mckenzie MD  Last physical: Visit date not found Last MTM visit: Visit date not found   Next visit within 3 mo: Visit date not found  Next physical within 3 mo: Visit date not found  Prescriber OR PCP: Rylie Mcneal MD  Last diagnosis associated with med order: 1. Hyperlipidemia  - atorvastatin (LIPITOR) 20 MG tablet [Pharmacy Med Name: ATORVASTATIN 20 MG TABLET]; TAKE 1 TABLET BY MOUTH EVERYDAY AT BEDTIME  Dispense: 90 tablet; Refill: 2    If protocol passes may refill for 12 months if within 3 months of last provider visit (or a total of 15 months).

## 2021-06-25 NOTE — TELEPHONE ENCOUNTER
RN cannot approve Refill Request    RN can NOT refill this medication med is not covered by policy/route to provider. Last office visit: 11/9/2017 Jose Maria Mckenzie MD Last Physical: 4/23/2019 Last MTM visit: Visit date not found Last visit same specialty: Visit date not found.  Next visit within 3 mo: Visit date not found  Next physical within 3 mo: Visit date not found      Hermila Smith, [facility] Triage/Med Refill 5/26/2021    Requested Prescriptions   Pending Prescriptions Disp Refills     lisinopriL-hydrochlorothiazide (PRINZIDE,ZESTORETIC) 20-12.5 mg per tablet [Pharmacy Med Name: LISINOPRIL-HCTZ 20-12.5 MG TAB] 90 tablet 0     Sig: TAKE 2 TABLETS BY MOUTH EVERY DAY       Diuretics/Combination Diuretics Refill Protocol  Failed - 5/26/2021 10:30 AM        Failed - Visit with PCP or prescribing provider visit in past 12 months     Last office visit with prescriber/PCP: 11/9/2017 Jose Maria Mckenzie MD OR same dept: Visit date not found OR same specialty: Visit date not found  Last physical: 4/23/2019 Last MTM visit: Visit date not found   Next visit within 3 mo: Visit date not found  Next physical within 3 mo: Visit date not found  Prescriber OR PCP: Jose Maria Mckenzie MD  Last diagnosis associated with med order: 1. Benign Essential Hypertension  - lisinopriL-hydrochlorothiazide (PRINZIDE,ZESTORETIC) 20-12.5 mg per tablet [Pharmacy Med Name: LISINOPRIL-HCTZ 20-12.5 MG TAB]; TAKE 2 TABLETS BY MOUTH EVERY DAY  Dispense: 90 tablet; Refill: 0    If protocol passes may refill for 12 months if within 3 months of last provider visit (or a total of 15 months).             Failed - Serum Potassium in past 12 months      No results found for: LN-POTASSIUM          Failed - Serum Sodium in past 12 months      No results found for: LN-SODIUM          Failed - Blood pressure on file in past 12 months     BP Readings from Last 1 Encounters:   04/23/19 (!) 168/108             Failed - Serum Creatinine in past 12  months      Creatinine   Date Value Ref Range Status   11/09/2017 1.01 0.70 - 1.30 mg/dL Final

## 2021-07-26 ENCOUNTER — OFFICE VISIT (OUTPATIENT)
Dept: FAMILY MEDICINE | Facility: CLINIC | Age: 66
End: 2021-07-26
Payer: COMMERCIAL

## 2021-07-26 VITALS
HEART RATE: 66 BPM | BODY MASS INDEX: 31.48 KG/M2 | SYSTOLIC BLOOD PRESSURE: 142 MMHG | DIASTOLIC BLOOD PRESSURE: 84 MMHG | OXYGEN SATURATION: 98 % | HEIGHT: 70 IN | WEIGHT: 219.9 LBS

## 2021-07-26 DIAGNOSIS — M25.552 HIP PAIN, LEFT: ICD-10-CM

## 2021-07-26 DIAGNOSIS — E78.5 DYSLIPIDEMIA: ICD-10-CM

## 2021-07-26 DIAGNOSIS — R37 ORGANIC SEXUAL DYSFUNCTION: Primary | ICD-10-CM

## 2021-07-26 DIAGNOSIS — Z11.59 ENCOUNTER FOR HEPATITIS C SCREENING TEST FOR LOW RISK PATIENT: ICD-10-CM

## 2021-07-26 DIAGNOSIS — I10 ESSENTIAL HYPERTENSION, BENIGN: ICD-10-CM

## 2021-07-26 DIAGNOSIS — Z12.5 SCREENING FOR PROSTATE CANCER: ICD-10-CM

## 2021-07-26 DIAGNOSIS — E78.49 OTHER HYPERLIPIDEMIA: ICD-10-CM

## 2021-07-26 DIAGNOSIS — S76.312D STRAIN OF LEFT HAMSTRING MUSCLE, SUBSEQUENT ENCOUNTER: ICD-10-CM

## 2021-07-26 DIAGNOSIS — M62.838 SPASM OF MUSCLE: ICD-10-CM

## 2021-07-26 LAB
ALBUMIN UR-MCNC: NEGATIVE MG/DL
APPEARANCE UR: CLEAR
BACTERIA #/AREA URNS HPF: NORMAL /HPF
BILIRUB UR QL STRIP: NEGATIVE
COLOR UR AUTO: YELLOW
ERYTHROCYTE [DISTWIDTH] IN BLOOD BY AUTOMATED COUNT: 13.3 % (ref 10–15)
GLUCOSE UR STRIP-MCNC: NEGATIVE MG/DL
HCT VFR BLD AUTO: 40.2 % (ref 40–53)
HGB BLD-MCNC: 13.1 G/DL (ref 13.3–17.7)
HGB UR QL STRIP: ABNORMAL
KETONES UR STRIP-MCNC: NEGATIVE MG/DL
LEUKOCYTE ESTERASE UR QL STRIP: NEGATIVE
MCH RBC QN AUTO: 30.7 PG (ref 26.5–33)
MCHC RBC AUTO-ENTMCNC: 32.6 G/DL (ref 31.5–36.5)
MCV RBC AUTO: 94 FL (ref 78–100)
NITRATE UR QL: NEGATIVE
PH UR STRIP: 5.5 [PH] (ref 5–8)
PLATELET # BLD AUTO: 250 10E3/UL (ref 150–450)
RBC # BLD AUTO: 4.27 10E6/UL (ref 4.4–5.9)
RBC #/AREA URNS AUTO: NORMAL /HPF
SP GR UR STRIP: 1.01 (ref 1–1.03)
SQUAMOUS #/AREA URNS AUTO: NORMAL /LPF
UROBILINOGEN UR STRIP-ACNC: 0.2 E.U./DL
WBC # BLD AUTO: 9.8 10E3/UL (ref 4–11)
WBC #/AREA URNS AUTO: NORMAL /HPF

## 2021-07-26 PROCEDURE — 84443 ASSAY THYROID STIM HORMONE: CPT | Performed by: FAMILY MEDICINE

## 2021-07-26 PROCEDURE — 85027 COMPLETE CBC AUTOMATED: CPT | Performed by: FAMILY MEDICINE

## 2021-07-26 PROCEDURE — 99214 OFFICE O/P EST MOD 30 MIN: CPT | Performed by: FAMILY MEDICINE

## 2021-07-26 PROCEDURE — 81001 URINALYSIS AUTO W/SCOPE: CPT | Performed by: FAMILY MEDICINE

## 2021-07-26 PROCEDURE — 80061 LIPID PANEL: CPT | Performed by: FAMILY MEDICINE

## 2021-07-26 PROCEDURE — 36415 COLL VENOUS BLD VENIPUNCTURE: CPT | Performed by: FAMILY MEDICINE

## 2021-07-26 PROCEDURE — 80053 COMPREHEN METABOLIC PANEL: CPT | Performed by: FAMILY MEDICINE

## 2021-07-26 PROCEDURE — G0103 PSA SCREENING: HCPCS | Performed by: FAMILY MEDICINE

## 2021-07-26 RX ORDER — CYCLOBENZAPRINE HCL 10 MG
10 TABLET ORAL 3 TIMES DAILY PRN
Qty: 90 TABLET | Refills: 1 | Status: SHIPPED | OUTPATIENT
Start: 2021-07-26 | End: 2022-08-04

## 2021-07-26 RX ORDER — SILDENAFIL 100 MG/1
100 TABLET, FILM COATED ORAL DAILY PRN
Qty: 30 TABLET | Refills: 11 | Status: SHIPPED | OUTPATIENT
Start: 2021-07-26 | End: 2024-03-11

## 2021-07-26 RX ORDER — ATORVASTATIN CALCIUM 20 MG/1
TABLET, FILM COATED ORAL
Qty: 90 TABLET | Refills: 3 | Status: SHIPPED | OUTPATIENT
Start: 2021-07-26 | End: 2022-08-05

## 2021-07-26 RX ORDER — LISINOPRIL AND HYDROCHLOROTHIAZIDE 12.5; 2 MG/1; MG/1
TABLET ORAL
Qty: 180 TABLET | Refills: 3 | Status: SHIPPED | OUTPATIENT
Start: 2021-07-26 | End: 2022-08-05

## 2021-07-26 ASSESSMENT — MIFFLIN-ST. JEOR: SCORE: 1783.71

## 2021-07-26 NOTE — PATIENT INSTRUCTIONS
Always great to see you Raji    See how to the family    I would like you to see Dr. Ni Phan [facility] she is outstanding  Have her work with you to see if this is a bit of hip as well as hamstring and direct physical therapy rehabilitation she may want to do an x-ray of your hip as well    Goal blood pressure less than 140 and the top number less than 85 and the bottom number  Lets restart the lisinopril hydrochlorothiazide at 20/12.52 daily  Perhaps come in for blood pressure check after you have been back on your medications for 1 month    I sent in the sildenafil/generic Viagra to [facility] pharmacy    We are doing regular laboratory screening today    You and your spouse should try to fill out a living will    This will help us direct healthcare decisions if you are unable to make a decision        Actively Pursue Wellness    Eat Whole Foods with High Nutritional Value  -----High Fats Nuts, Olive Oil, Fish, Avocados  -----Lean Meats  -----Vegetables Colorful and In abundance  -----Fresh fruits  -----Beans, Legumes  and Whole Grains    Engage in Moderate Exercise  -----30-60 minutes daily    Get Intentional Restorative Sleep  -----8-10 hours    Cultivate and Maintain Healthy Relationships  -----Family and Friends  -----Work Place  -----Community      Remove and Harmful Factors  -----Stressors  Work and Home   -----Toxins:  Tobacco, Alcohol in Excess, Processed Sugars (White Stuff and High Fructose Corn Syrup, Pollutants (Air and Water)      Be Present and Mindful for Yourself and Family  -----Laugh Together  -----Talk Together and Listen to your Body and Family and Friends  -----Enjoy Meals together        There are Five Documented ways to Reduce Risk  for Chronic Illness:    Exercise Daily  30-60 minutes  No Tobacco Products  Red Wine 4-8 oz daily  BMI less than 25  Mediterranean Style Diet     Eat more Plants>> in abundance and of many colors!  Phyto-nutrients activate our health potential.   Choose:  More Colors. More Diversity  Get More Benefit.    Cultivate that inner Compost bin!    Sharad

## 2021-07-26 NOTE — PROGRESS NOTES
ASSESSMENT & PLAN    No problem-specific Assessment & Plan notes found for this encounter.      Raji was seen today for follow up and medication update.    Diagnoses and all orders for this visit:    Organic sexual dysfunction  -     sildenafil (VIAGRA) 100 MG tablet; Take 1 tablet (100 mg) by mouth daily as needed    Hyperlipidemia    Essential hypertension, benign  -     lisinopril-hydrochlorothiazide (ZESTORETIC) 20-12.5 MG tablet; [LISINOPRIL-HYDROCHLOROTHIAZIDE (PRINZIDE,ZESTORETIC) 20-12.5 MG PER TABLET] TAKE 2 TABLETS BY MOUTH EVERY DAY  -     Comprehensive metabolic panel (BMP + Alb, Alk Phos, ALT, AST, Total. Bili, TP); Future  -     CBC with platelets; Future  -     TSH with free T4 reflex; Future  -     UA with Microscopic reflex to Culture - lab collect; Future  -     Comprehensive metabolic panel (BMP + Alb, Alk Phos, ALT, AST, Total. Bili, TP)  -     CBC with platelets  -     TSH with free T4 reflex  -     UA with Microscopic reflex to Culture - lab collect    Spasm of muscle  -     cyclobenzaprine (FLEXERIL) 10 MG tablet; Take 1 tablet (10 mg) by mouth 3 times daily as needed for muscle spasms    Dyslipidemia  -     atorvastatin (LIPITOR) 20 MG tablet; [ATORVASTATIN (LIPITOR) 20 MG TABLET] TAKE 1 TABLET BY MOUTH EVERYDAY AT BEDTIME  -     Comprehensive metabolic panel (BMP + Alb, Alk Phos, ALT, AST, Total. Bili, TP); Future  -     Lipid Profile (Chol, Trig, HDL, LDL calc); Future  -     Comprehensive metabolic panel (BMP + Alb, Alk Phos, ALT, AST, Total. Bili, TP)  -     Lipid Profile (Chol, Trig, HDL, LDL calc)    Hip pain, left  -     Orthopedic  Referral; Future    Strain of left hamstring muscle, subsequent encounter  -     Orthopedic  Referral; Future    Screening for prostate cancer  -     PSA, screen; Future  -     PSA, screen        There are no Patient Instructions on file for this visit.    No follow-ups on file.           CHIEF COMPLAINT: Raji Chahal had concerns  "including Follow Up and Medication Update.    [place]: 1.............. SUBJECTIVE:  Raji Chahal is a 65 year old male had concerns including Follow Up and Medication Update.    1. Organic sexual dysfunction    2. Hyperlipidemia    3. Essential hypertension, benign    4. Spasm of muscle    5. Dyslipidemia    6. Hip pain, left    7. Strain of left hamstring muscle, subsequent encounter    8. Screening for prostate cancer      Patient fell onto the bushes  Ended up sustaining multiple abrasions  Probably has some mild superinfection  Itchy spots on his arms trunk as well as legs and buttock  Pain in his left hip  Past 6 months to a year  Worse with long car rides better with moving  Getting in the car hurts  It happened maybe during the sexual encounter with his spouse  Felt like he strained his hamstring    He has been out of his medications      Allergies   Allergen Reactions     Penicillins Hives and Swelling                         SOCIAL: He  reports that he has quit smoking. His smoking use included cigarettes. He has a 5.00 pack-year smoking history. He has quit using smokeless tobacco. He reports current alcohol use of about 2.0 standard drinks of alcohol per week. He reports current drug use. Drug: Marijuana.    REVIEW OF SYSTEMS:   Family history not pertinent to chief complaint or presenting problem    Review of systems otherwise negative as requested from patient, except   Those positive ROS outlined and discussed in [place].      VITALS:  Vitals:    07/26/21 1605 07/26/21 1608   BP: (!) 144/82 (!) 142/84   BP Location: Left arm Right arm   Patient Position: Sitting Sitting   Cuff Size: Adult Large Adult Large   Pulse: 66    SpO2: 98%    Weight: 99.7 kg (219 lb 14.4 oz)    Height: 1.778 m (5' 10\")      Wt Readings from Last 3 Encounters:   07/26/21 99.7 kg (219 lb 14.4 oz)   04/23/19 98.4 kg (217 lb)   11/09/17 96.2 kg (212 lb)     Body mass index is 31.55 kg/m .    Physical Exam:      Physical:  General " Appearance: Healthy-appearingy.   Head:  No deformity  Eyes: Sclerae white, pupils equal and reactive, extra ocular movements intact cataracts have been extracted  Ears: Well-positioned, well-formed pinnae; TM pearly white, translucent, no bulging   Nose: Clear, normal mucosa no drainage or crusting  Throat: Lips, tongue, and mucosa are moist, pink and intact; tongue no thrush oral pharynx no injection or lesions multiple missing teeth one broken off lower right molar  Neck: Supple, symmetric ROM no nodes   No carotid Buits  Chest: Lungs clear to auscultation, no retractions  Heart: Regular rate & rhythm, S1 S2, no murmur  Abdomen: Soft, non-tender, no masses; umbilical area normal   Pulses: Equal femoral pulses  : No hernia palpable   Extremities: Well-perfused, warm and dry, No Edema discomfort with internal range of motion of the hip on the left tenderness to palpation across the hamstring insertion on the left palpable Pulses Bilateral  Neuro: Easily aroused good tone NO tremor   Skin areas of small hyperpigmentation with excoriation consistent with contact irritation with healing lesions           I spent 30  minutes with this patient.  This includes pre-visit, intra-visit and post visit work an evaluation with regards to Raji was seen today for follow up and medication update.    Diagnoses and all orders for this visit:    Organic sexual dysfunction  -     sildenafil (VIAGRA) 100 MG tablet; Take 1 tablet (100 mg) by mouth daily as needed    Hyperlipidemia    Essential hypertension, benign  -     lisinopril-hydrochlorothiazide (ZESTORETIC) 20-12.5 MG tablet; [LISINOPRIL-HYDROCHLOROTHIAZIDE (PRINZIDE,ZESTORETIC) 20-12.5 MG PER TABLET] TAKE 2 TABLETS BY MOUTH EVERY DAY  -     Comprehensive metabolic panel (BMP + Alb, Alk Phos, ALT, AST, Total. Bili, TP); Future  -     CBC with platelets; Future  -     TSH with free T4 reflex; Future  -     UA with Microscopic reflex to Culture - lab collect; Future  -      Comprehensive metabolic panel (BMP + Alb, Alk Phos, ALT, AST, Total. Bili, TP)  -     CBC with platelets  -     TSH with free T4 reflex  -     UA with Microscopic reflex to Culture - lab collect    Spasm of muscle  -     cyclobenzaprine (FLEXERIL) 10 MG tablet; Take 1 tablet (10 mg) by mouth 3 times daily as needed for muscle spasms    Dyslipidemia  -     atorvastatin (LIPITOR) 20 MG tablet; [ATORVASTATIN (LIPITOR) 20 MG TABLET] TAKE 1 TABLET BY MOUTH EVERYDAY AT BEDTIME  -     Comprehensive metabolic panel (BMP + Alb, Alk Phos, ALT, AST, Total. Bili, TP); Future  -     Lipid Profile (Chol, Trig, HDL, LDL calc); Future  -     Comprehensive metabolic panel (BMP + Alb, Alk Phos, ALT, AST, Total. Bili, TP)  -     Lipid Profile (Chol, Trig, HDL, LDL calc)    Hip pain, left  -     Orthopedic  Referral; Future    Strain of left hamstring muscle, subsequent encounter  -     Orthopedic  Referral; Future    Screening for prostate cancer  -     PSA, screen; Future  -     PSA, screen        Jose Maria Mckenzie MD  Family Medicine   [facility] [phone]

## 2021-07-27 LAB
ALBUMIN SERPL-MCNC: 4.2 G/DL (ref 3.5–5)
ALP SERPL-CCNC: 86 U/L (ref 45–120)
ALT SERPL W P-5'-P-CCNC: 10 U/L (ref 0–45)
ANION GAP SERPL CALCULATED.3IONS-SCNC: 12 MMOL/L (ref 5–18)
AST SERPL W P-5'-P-CCNC: 17 U/L (ref 0–40)
BILIRUB SERPL-MCNC: 0.3 MG/DL (ref 0–1)
BUN SERPL-MCNC: 26 MG/DL (ref 8–22)
CALCIUM SERPL-MCNC: 9.6 MG/DL (ref 8.5–10.5)
CHLORIDE BLD-SCNC: 108 MMOL/L (ref 98–107)
CHOLEST SERPL-MCNC: 239 MG/DL
CO2 SERPL-SCNC: 21 MMOL/L (ref 22–31)
CREAT SERPL-MCNC: 1.04 MG/DL (ref 0.7–1.3)
FASTING STATUS PATIENT QL REPORTED: YES
GFR SERPL CREATININE-BSD FRML MDRD: 74 ML/MIN/1.73M2
GLUCOSE BLD-MCNC: 86 MG/DL (ref 70–125)
HDLC SERPL-MCNC: 45 MG/DL
LDLC SERPL CALC-MCNC: 165 MG/DL
POTASSIUM BLD-SCNC: 4.3 MMOL/L (ref 3.5–5)
PROT SERPL-MCNC: 7.6 G/DL (ref 6–8)
PSA SERPL-MCNC: 0.75 UG/L (ref 0–4.5)
SODIUM SERPL-SCNC: 141 MMOL/L (ref 136–145)
TRIGL SERPL-MCNC: 145 MG/DL
TSH SERPL DL<=0.005 MIU/L-ACNC: 4.92 UIU/ML (ref 0.3–5)

## 2021-10-11 ENCOUNTER — HEALTH MAINTENANCE LETTER (OUTPATIENT)
Age: 66
End: 2021-10-11

## 2022-08-04 DIAGNOSIS — E78.5 DYSLIPIDEMIA: ICD-10-CM

## 2022-08-04 DIAGNOSIS — M62.838 SPASM OF MUSCLE: ICD-10-CM

## 2022-08-04 DIAGNOSIS — I10 ESSENTIAL HYPERTENSION, BENIGN: ICD-10-CM

## 2022-08-04 RX ORDER — CYCLOBENZAPRINE HCL 10 MG
10 TABLET ORAL 3 TIMES DAILY PRN
Qty: 90 TABLET | Refills: 1 | Status: SHIPPED | OUTPATIENT
Start: 2022-08-04

## 2022-08-04 NOTE — TELEPHONE ENCOUNTER
Controlled Substance Refill Request for cyclobenzaprine (FLEXERIL) 10 MG tablet    Last prescription:     Order History  Outpatient  Date/Time Action Taken User Additional Information   07/26/21 [id number] Jose Maria Hankins MD      Outpatient Medication Detail     Disp Refills Start End ANA   cyclobenzaprine (FLEXERIL) 10 MG tablet 90 tablet 1 7/26/2021  --   Sig - Route: Take 1 tablet (10 mg) by mouth 3 times daily as needed for muscle spasms - Oral     Last clinic visit: 07/26/21 w/ Dr. Mckenzie     Clinic visit frequency required: unable to determine  Next appt: none scheduled    Controlled substance agreement on file: No.    Documentation in problem list reviewed:  No    Processing:  Rx to be electronically transmitted to pharmacy by provider      ,DirectAddress_Unknown

## 2022-08-05 RX ORDER — LISINOPRIL AND HYDROCHLOROTHIAZIDE 12.5; 2 MG/1; MG/1
TABLET ORAL
Qty: 180 TABLET | Refills: 0 | Status: SHIPPED | OUTPATIENT
Start: 2022-08-05 | End: 2023-01-25

## 2022-08-05 RX ORDER — ATORVASTATIN CALCIUM 20 MG/1
TABLET, FILM COATED ORAL
Qty: 90 TABLET | Refills: 0 | Status: SHIPPED | OUTPATIENT
Start: 2022-08-05 | End: 2023-01-25

## 2022-08-05 NOTE — TELEPHONE ENCOUNTER
"Routing refill request to provider for review/approval because:  Labs not current:  LDL, creatinine, potassium, sodium  Patient needs to be seen because it has been more than 1 year since last office visit.  Blood pressure not current    Last office visit provider:  7/26/2021 Dr. Mckenzie     Atorvastatin Last Written Prescription Date:  7/26/2021  Last Fill Quantity: 90,  # refills: 3     Lisinopril-hydrochlorothiazide Last Written Prescription Date:  7/26/2021  Last Fill Quantity: 180,  # refills: 3     Requested Prescriptions   Pending Prescriptions Disp Refills     atorvastatin (LIPITOR) 20 MG tablet 90 tablet 3     Sig: [ATORVASTATIN (LIPITOR) 20 MG TABLET] TAKE 1 TABLET BY MOUTH EVERYDAY AT BEDTIME       Statins Protocol Failed - 8/4/2022  9:58 AM        Failed - LDL on file in past 12 months     Recent Labs   Lab Test 07/26/21  1737   *             Failed - Recent (12 mo) or future (30 days) visit within the authorizing provider's specialty     Patient has had an office visit with the authorizing provider or a provider within the authorizing providers department within the previous 12 mos or has a future within next 30 days. See \"Patient Info\" tab in inbasket, or \"Choose Columns\" in Meds & Orders section of the refill encounter.              Passed - No abnormal creatine kinase in past 12 months     No lab results found.             Passed - Medication is active on med list        Passed - Patient is age 18 or older           lisinopril-hydrochlorothiazide (ZESTORETIC) 20-12.5 MG tablet 180 tablet 3     Sig: [LISINOPRIL-HYDROCHLOROTHIAZIDE (PRINZIDE,ZESTORETIC) 20-12.5 MG PER TABLET] TAKE 2 TABLETS BY MOUTH EVERY DAY       Diuretics (Including Combos) Protocol Failed - 8/4/2022  9:58 AM        Failed - Blood pressure under 140/90 in past 12 months     BP Readings from Last 3 Encounters:   07/26/21 (!) 142/84                 Failed - Recent (12 mo) or future (30 days) visit within the authorizing " "provider's specialty     Patient has had an office visit with the authorizing provider or a provider within the authorizing providers department within the previous 12 mos or has a future within next 30 days. See \"Patient Info\" tab in inbasket, or \"Choose Columns\" in Meds & Orders section of the refill encounter.              Failed - Normal serum creatinine on file in past 12 months     Recent Labs   Lab Test 07/26/21  1737   CR 1.04              Failed - Normal serum potassium on file in past 12 months     Recent Labs   Lab Test 07/26/21  1737   POTASSIUM 4.3                    Failed - Normal serum sodium on file in past 12 months     Recent Labs   Lab Test 07/26/21  1737                 Passed - Medication is active on med list        Passed - Patient is age 18 or older       ACE Inhibitors (Including Combos) Protocol Failed - 8/4/2022  9:58 AM        Failed - Blood pressure under 140/90 in past 12 months     BP Readings from Last 3 Encounters:   07/26/21 (!) 142/84                 Failed - Recent (12 mo) or future (30 days) visit within the authorizing provider's specialty     Patient has had an office visit with the authorizing provider or a provider within the authorizing providers department within the previous 12 mos or has a future within next 30 days. See \"Patient Info\" tab in [employer], or \"Choose Columns\" in Meds & Orders section of the refill encounter.              Failed - Normal serum creatinine on file in past 12 months     Recent Labs   Lab Test 07/26/21  1737   CR 1.04       Ok to refill medication if creatinine is low          Failed - Normal serum potassium on file in past 12 months     Recent Labs   Lab Test 07/26/21  1737   POTASSIUM 4.3             Passed - Medication is active on med list        Passed - Patient is age 18 or older             Magalis Zuniga RN 08/05/22 8:35 AM  "

## 2022-09-25 ENCOUNTER — HEALTH MAINTENANCE LETTER (OUTPATIENT)
Age: 67
End: 2022-09-25

## 2022-10-21 NOTE — TELEPHONE ENCOUNTER
Refill Approved    Rx renewed per Medication Renewal Policy. Medication was last renewed on 11/5/20.    Luke Galindo, [facility] Connection Triage/Med Refill 2/5/2021     Requested Prescriptions   Pending Prescriptions Disp Refills     escitalopram oxalate (LEXAPRO) 10 MG tablet 90 tablet 0     Sig: Take 1 tablet (10 mg total) by mouth daily.       SSRI Refill Protocol  Passed - 2/5/2021  5:10 PM        Passed - PCP or prescribing provider visit in last year     Last office visit with prescriber/PCP: 11/9/2017 Jose Maria Mckenzie MD OR same dept: Visit date not found OR same specialty: 11/9/2017 Jose Maria Mckenzie MD  Last physical: 4/23/2019 Last MTM visit: Visit date not found   Next visit within 3 mo: Visit date not found  Next physical within 3 mo: Visit date not found  Prescriber OR PCP: Jose Maria Mckenzie MD  Last diagnosis associated with med order: 1. Anxiety  - escitalopram oxalate (LEXAPRO) 10 MG tablet; Take 1 tablet (10 mg total) by mouth daily.  Dispense: 90 tablet; Refill: 0    If protocol passes may refill for 12 months if within 3 months of last provider visit (or a total of 15 months).                            
01-Apr-2022

## 2023-01-25 ENCOUNTER — OFFICE VISIT (OUTPATIENT)
Dept: FAMILY MEDICINE | Facility: CLINIC | Age: 68
End: 2023-01-25
Payer: COMMERCIAL

## 2023-01-25 VITALS
RESPIRATION RATE: 18 BRPM | WEIGHT: 235 LBS | TEMPERATURE: 97.5 F | SYSTOLIC BLOOD PRESSURE: 154 MMHG | DIASTOLIC BLOOD PRESSURE: 102 MMHG | HEART RATE: 62 BPM | BODY MASS INDEX: 33.64 KG/M2 | HEIGHT: 70 IN

## 2023-01-25 DIAGNOSIS — I10 ESSENTIAL HYPERTENSION, BENIGN: Primary | ICD-10-CM

## 2023-01-25 DIAGNOSIS — F41.9 ANXIETY: ICD-10-CM

## 2023-01-25 DIAGNOSIS — E78.5 DYSLIPIDEMIA: ICD-10-CM

## 2023-01-25 PROBLEM — R31.29 MICROSCOPIC HEMATURIA: Status: RESOLVED | Noted: 2019-04-24 | Resolved: 2023-01-25

## 2023-01-25 PROCEDURE — 99214 OFFICE O/P EST MOD 30 MIN: CPT | Performed by: FAMILY MEDICINE

## 2023-01-25 RX ORDER — AMLODIPINE BESYLATE 5 MG/1
5 TABLET ORAL DAILY
Qty: 90 TABLET | Refills: 3 | Status: SHIPPED | OUTPATIENT
Start: 2023-01-25 | End: 2024-02-12

## 2023-01-25 RX ORDER — ESCITALOPRAM OXALATE 10 MG/1
10 TABLET ORAL DAILY
Qty: 90 TABLET | Refills: 3 | Status: SHIPPED | OUTPATIENT
Start: 2023-01-25 | End: 2024-02-12

## 2023-01-25 RX ORDER — ATORVASTATIN CALCIUM 20 MG/1
TABLET, FILM COATED ORAL
Qty: 90 TABLET | Refills: 3 | Status: SHIPPED | OUTPATIENT
Start: 2023-01-25 | End: 2024-02-12

## 2023-01-25 RX ORDER — LISINOPRIL AND HYDROCHLOROTHIAZIDE 12.5; 2 MG/1; MG/1
TABLET ORAL
Qty: 180 TABLET | Refills: 3 | Status: SHIPPED | OUTPATIENT
Start: 2023-01-25 | End: 2024-02-12

## 2023-01-25 ASSESSMENT — PAIN SCALES - GENERAL: PAINLEVEL: NO PAIN (0)

## 2023-01-25 NOTE — PROGRESS NOTES
"S: Raji Chahal is a 67 year old male with htn, not controlled.  Ran out of lisinopril/hctz he had, but found some extra.  Has been on last few days. BP up and down    Hyperlipidemia: statin.  Needs fills.     Anxiety: was on lexapro, now off.  Worse anxiety, would like to go back on.     O:BP (!) 154/102   Pulse 62   Temp 97.5  F (36.4  C) (Tympanic)   Resp 18   Ht 1.778 m (5' 10\")   Wt 106.6 kg (235 lb)   BMI 33.72 kg/m    GEN: Alert and oriented, in no acute distress  CV: RRR, no murmur  RESP: lungs clear bilaterally, good effort  EXT: no edema or lesions noted in lower extremities    A; htn, not controlled      Anxiety, not controlled       Hyperlipidemia, not controlled    P: back on meds, including lexapro.  Add norvasc too.   Back in a month, labs, FiT test.  He agrees.      "

## 2023-03-13 ENCOUNTER — TELEPHONE (OUTPATIENT)
Dept: FAMILY MEDICINE | Facility: CLINIC | Age: 68
End: 2023-03-13
Payer: COMMERCIAL

## 2023-03-13 NOTE — TELEPHONE ENCOUNTER
Patient Quality Outreach    Patient is due for the following:   Hypertension -  Hypertension follow-up visit    Next Steps:   Schedule a office visit for blood pressure     Type of outreach:    Phone, left message for patient/parent to call back.      Questions for provider review:    None     Aracely Luu MA

## 2023-06-07 ENCOUNTER — TELEPHONE (OUTPATIENT)
Dept: FAMILY MEDICINE | Facility: CLINIC | Age: 68
End: 2023-06-07
Payer: COMMERCIAL

## 2023-06-07 NOTE — LETTER
June 7, 2023    To  Raji Chahal  [address] [place] [id number]    Your team at [facility] cares about your health. We have reviewed your chart and based on our findings; we are making the following recommendations to better manage your health.     You are in particular need of attention regarding the following:     HYPERTENSION FOLLOW UP: Office Visit  PREVENTATIVE VISIT: Annual Medicare Wellness:Schedule an Annual Medicare Wellness Exam. Please call your [facility] clinic to set up your appointment.    If you have already completed these items, please contact the clinic via phone or   MyChart so your care team can review and update your records. Thank you for   choosing [facility] for your healthcare needs. For any questions,   concerns, or to schedule an appointment please contact our clinic.    Healthy Regards,      Your [facility] Care Team

## 2023-06-07 NOTE — TELEPHONE ENCOUNTER
Patient Quality Outreach    Patient is due for the following:   Hypertension -  Hypertension follow-up visit    Next Steps:   Schedule a office visit for hypertension    Type of outreach:    Sent letter.      Questions for provider review:    None           Aracely Luu MA

## 2023-09-20 ENCOUNTER — TELEPHONE (OUTPATIENT)
Dept: FAMILY MEDICINE | Facility: CLINIC | Age: 68
End: 2023-09-20
Payer: MEDICARE

## 2023-09-20 NOTE — TELEPHONE ENCOUNTER
Patient Quality Outreach    Patient is due for the following:   Hypertension -  Hypertension follow-up visit    Next Steps:   Schedule a office visit for BP    Type of outreach:    Sent Contin[employer] message.      Questions for provider review:    None           Aracely Luu MA

## 2023-10-14 ENCOUNTER — HEALTH MAINTENANCE LETTER (OUTPATIENT)
Age: 68
End: 2023-10-14

## 2023-12-11 ENCOUNTER — TELEPHONE (OUTPATIENT)
Dept: FAMILY MEDICINE | Facility: CLINIC | Age: 68
End: 2023-12-11
Payer: MEDICARE

## 2023-12-11 NOTE — TELEPHONE ENCOUNTER
Patient Quality Outreach    Patient is due for the following:   Hypertension -  Hypertension follow-up visit    Next Steps:   Schedule a office visit for hypertension    Type of outreach:    Phone, left message for patient/parent to call back. Is he checking Bp at home?       Questions for provider review:    None           Aracely Luu MA

## 2024-02-08 DIAGNOSIS — I10 ESSENTIAL HYPERTENSION, BENIGN: ICD-10-CM

## 2024-02-08 DIAGNOSIS — E78.5 DYSLIPIDEMIA: ICD-10-CM

## 2024-02-08 DIAGNOSIS — F41.9 ANXIETY: ICD-10-CM

## 2024-02-09 NOTE — TELEPHONE ENCOUNTER
Pending Prescriptions:                       Disp   Refills    escitalopram (LEXAPRO) 10 MG tablet [Pharm*90 tab*0        Sig: Take 1 tablet by mouth once daily    lisinopril-hydrochlorothiazide (ZESTORETIC*180 ta*0        Sig: Take 1 tablet by mouth twice daily    atorvastatin (LIPITOR) 20 MG tablet [Pharm*90 tab*0        Sig: TAKE 1 TABLET BY MOUTH ONCE DAILY AT BEDTIME    amLODIPine (NORVASC) 5 MG tablet [Pharmacy*90 tab*0        Sig: Take 1 tablet by mouth once daily    Routing refill request to provider for review/approval because:  Labs not current  Patient needs to be seen because it has been more than 1 year since last office visit.  Pt has an appt scheduled on 3/11/24.    Brenda Bellamy RN  [facility]

## 2024-02-12 RX ORDER — ESCITALOPRAM OXALATE 10 MG/1
10 TABLET ORAL DAILY
Qty: 30 TABLET | Refills: 0 | Status: SHIPPED | OUTPATIENT
Start: 2024-02-12 | End: 2024-03-11

## 2024-02-12 RX ORDER — AMLODIPINE BESYLATE 5 MG/1
5 TABLET ORAL DAILY
Qty: 30 TABLET | Refills: 0 | Status: SHIPPED | OUTPATIENT
Start: 2024-02-12 | End: 2024-03-11

## 2024-02-12 RX ORDER — ATORVASTATIN CALCIUM 20 MG/1
TABLET, FILM COATED ORAL
Qty: 30 TABLET | Refills: 0 | Status: SHIPPED | OUTPATIENT
Start: 2024-02-12 | End: 2024-03-11

## 2024-02-12 RX ORDER — LISINOPRIL AND HYDROCHLOROTHIAZIDE 12.5; 2 MG/1; MG/1
TABLET ORAL
Qty: 60 TABLET | Refills: 0 | Status: SHIPPED | OUTPATIENT
Start: 2024-02-12 | End: 2024-03-11

## 2024-03-11 ENCOUNTER — OFFICE VISIT (OUTPATIENT)
Dept: FAMILY MEDICINE | Facility: CLINIC | Age: 69
End: 2024-03-11
Payer: MEDICARE

## 2024-03-11 VITALS
WEIGHT: 219.8 LBS | BODY MASS INDEX: 31.54 KG/M2 | DIASTOLIC BLOOD PRESSURE: 82 MMHG | RESPIRATION RATE: 16 BRPM | TEMPERATURE: 97.5 F | SYSTOLIC BLOOD PRESSURE: 120 MMHG | OXYGEN SATURATION: 97 % | HEART RATE: 72 BPM

## 2024-03-11 DIAGNOSIS — I10 ESSENTIAL HYPERTENSION, BENIGN: Primary | ICD-10-CM

## 2024-03-11 DIAGNOSIS — R37 ORGANIC SEXUAL DYSFUNCTION: ICD-10-CM

## 2024-03-11 DIAGNOSIS — E78.5 DYSLIPIDEMIA: ICD-10-CM

## 2024-03-11 DIAGNOSIS — Z12.11 SPECIAL SCREENING FOR MALIGNANT NEOPLASMS, COLON: ICD-10-CM

## 2024-03-11 DIAGNOSIS — F41.9 ANXIETY: ICD-10-CM

## 2024-03-11 LAB
ANION GAP SERPL CALCULATED.3IONS-SCNC: 12 MMOL/L (ref 7–15)
BUN SERPL-MCNC: 31 MG/DL (ref 8–23)
CALCIUM SERPL-MCNC: 9.4 MG/DL (ref 8.8–10.2)
CHLORIDE SERPL-SCNC: 101 MMOL/L (ref 98–107)
CHOLEST SERPL-MCNC: 131 MG/DL
CREAT SERPL-MCNC: 1.44 MG/DL (ref 0.67–1.17)
DEPRECATED HCO3 PLAS-SCNC: 23 MMOL/L (ref 22–29)
EGFRCR SERPLBLD CKD-EPI 2021: 53 ML/MIN/1.73M2
FASTING STATUS PATIENT QL REPORTED: NO
GLUCOSE SERPL-MCNC: 133 MG/DL (ref 70–99)
HDLC SERPL-MCNC: 41 MG/DL
LDLC SERPL CALC-MCNC: 69 MG/DL
NONHDLC SERPL-MCNC: 90 MG/DL
POTASSIUM SERPL-SCNC: 4.4 MMOL/L (ref 3.4–5.3)
SODIUM SERPL-SCNC: 136 MMOL/L (ref 135–145)
TRIGL SERPL-MCNC: 106 MG/DL

## 2024-03-11 PROCEDURE — G2211 COMPLEX E/M VISIT ADD ON: HCPCS | Performed by: FAMILY MEDICINE

## 2024-03-11 PROCEDURE — 99214 OFFICE O/P EST MOD 30 MIN: CPT | Performed by: FAMILY MEDICINE

## 2024-03-11 PROCEDURE — 80048 BASIC METABOLIC PNL TOTAL CA: CPT | Performed by: FAMILY MEDICINE

## 2024-03-11 PROCEDURE — 36415 COLL VENOUS BLD VENIPUNCTURE: CPT | Performed by: FAMILY MEDICINE

## 2024-03-11 PROCEDURE — 80061 LIPID PANEL: CPT | Performed by: FAMILY MEDICINE

## 2024-03-11 RX ORDER — LISINOPRIL AND HYDROCHLOROTHIAZIDE 12.5; 2 MG/1; MG/1
TABLET ORAL
Qty: 180 TABLET | Refills: 3 | Status: SHIPPED | OUTPATIENT
Start: 2024-03-11

## 2024-03-11 RX ORDER — ATORVASTATIN CALCIUM 20 MG/1
TABLET, FILM COATED ORAL
Qty: 90 TABLET | Refills: 3 | Status: SHIPPED | OUTPATIENT
Start: 2024-03-11

## 2024-03-11 RX ORDER — SILDENAFIL 100 MG/1
100 TABLET, FILM COATED ORAL DAILY PRN
Qty: 30 TABLET | Refills: 11 | Status: SHIPPED | OUTPATIENT
Start: 2024-03-11

## 2024-03-11 RX ORDER — ESCITALOPRAM OXALATE 10 MG/1
15 TABLET ORAL DAILY
Qty: 135 TABLET | Refills: 3 | Status: SHIPPED | OUTPATIENT
Start: 2024-03-11

## 2024-03-11 RX ORDER — AMLODIPINE BESYLATE 5 MG/1
5 TABLET ORAL DAILY
Qty: 90 TABLET | Refills: 3 | Status: SHIPPED | OUTPATIENT
Start: 2024-03-11

## 2024-03-11 ASSESSMENT — PAIN SCALES - GENERAL: PAINLEVEL: NO PAIN (0)

## 2024-03-11 NOTE — NURSING NOTE
"Chief Complaint   Patient presents with    Hypertension     /82   Pulse 72   Temp 97.5  F (36.4  C) (Tympanic)   Resp 16   Wt 99.7 kg (219 lb 12.8 oz)   SpO2 97%   BMI 31.54 kg/m   Estimated body mass index is 31.54 kg/m  as calculated from the following:    Height as of 1/25/23: 1.778 m (5' 10\").    Weight as of this encounter: 99.7 kg (219 lb 12.8 oz).  Patient presents to the clinic using No DME      Health Maintenance that is potentially due pending provider review:    Health Maintenance Due   Topic Date Due    ANNUAL REVIEW OF HM ORDERS  Never done    ADVANCE CARE PLANNING  Never done    HEPATITIS C SCREENING  Never done    LUNG CANCER SCREENING  Never done    RSV VACCINE (Pregnancy & 60+) (1 - 1-dose 60+ series) Never done    Pneumococcal Vaccine: 65+ Years (1 of 1 - PCV) Never done    MEDICARE ANNUAL WELLNESS VISIT  07/26/2022    LIPID  07/26/2022    COLORECTAL CANCER SCREENING  06/15/2023    ZOSTER IMMUNIZATION (3 of 3) 01/07/2024                  "

## 2024-03-11 NOTE — PROGRESS NOTES
"S :Raji Chahal is a 68 year old male with anxiety.  Would like to go up on lexapro dose some, \"seems I get more anxious as I get older\"     Hyperlipidemia: statin.  Stable, fills  Htn: stable on meds.  Fills/labs  Erectile dysfunction: viagra.  Fills    Current Outpatient Medications   Medication    amLODIPine (NORVASC) 5 MG tablet    atorvastatin (LIPITOR) 20 MG tablet    escitalopram (LEXAPRO) 10 MG tablet    lisinopril-hydrochlorothiazide (ZESTORETIC) 20-12.5 MG tablet    sildenafil (VIAGRA) 100 MG tablet    cyclobenzaprine (FLEXERIL) 10 MG tablet     No current facility-administered medications for this visit.       O:/82   Pulse 72   Temp 97.5  F (36.4  C) (Tympanic)   Resp 16   Wt 99.7 kg (219 lb 12.8 oz)   SpO2 97%   BMI 31.54 kg/m    GEN: Alert and oriented, in no acute distress  CV: RRR, no murmur  RESP: lungs clear bilaterally, good effort  EXT: no edema or lesions noted in lower extremities    A: anxiety.  Would like to go up on lexapro dose some,  Htn: stable on meds.  Fills/labs  Erectile dysfunction: viagra.  Fills  Hyperlipidemia: statin, stable, fills    P: fills on meds for chronic issues as above in med list and orders.   Labs ordered as appropriate for monitoring the listed medical conditions.    Continue medications for medical issues as above in med list and orders     The longitudinal plan of care for the diagnosis(es)/condition(s) as documented were addressed during this visit. Due to the added complexity in care, I will continue to support Raji in the subsequent management and with ongoing continuity of care.      "

## 2024-04-22 NOTE — TELEPHONE ENCOUNTER
RN cannot approve Refill Request    RN can NOT refill this medication PCP messaged that patient is overdue for Office Visit. Last office visit: 11/9/2017 Jose Maria Mkcenzie MD Last Physical: 4/23/2019 Last MTM visit: Visit date not found Last visit same specialty: 11/9/2017 Jose Maria Mckenzie MD.  Next visit within 3 mo: Visit date not found  Next physical within 3 mo: Visit date not found      Yesenia Mauricio, [facility] Triage/Med Refill 8/11/2020    Requested Prescriptions   Pending Prescriptions Disp Refills     escitalopram oxalate (LEXAPRO) 10 MG tablet [Pharmacy Med Name: ESCITALOPRAM 10 MG TABLET] 90 tablet 0     Sig: TAKE 1 TABLET BY MOUTH DAILY FOR ANXIETY. NEED APPT FOR FURTHER REFILLS.       [facility] Refill Protocol  Failed - 8/10/2020  6:03 PM        Failed - PCP or prescribing provider visit in last year     Last office visit with prescriber/PCP: 11/9/2017 Jose Maria Mckenzie MD OR same dept: Visit date not found OR same specialty: 11/9/2017 Jose Maria Mckenzie MD  Last physical: 4/23/2019 Last MTM visit: Visit date not found   Next visit within 3 mo: Visit date not found  Next physical within 3 mo: Visit date not found  Prescriber OR PCP: Jose Maria Mckenzie MD  Last diagnosis associated with med order: 1. Anxiety  - escitalopram oxalate (LEXAPRO) 10 MG tablet [Pharmacy Med Name: ESCITALOPRAM 10 MG TABLET]; TAKE 1 TABLET BY MOUTH DAILY FOR ANXIETY. NEED APPT FOR FURTHER REFILLS.  Dispense: 90 tablet; Refill: 0    If protocol passes may refill for 12 months if within 3 months of last provider visit (or a total of 15 months).                   
no

## 2024-12-07 ENCOUNTER — HEALTH MAINTENANCE LETTER (OUTPATIENT)
Age: 69
End: 2024-12-07

## 2025-03-31 DIAGNOSIS — F41.9 ANXIETY: ICD-10-CM

## 2025-03-31 DIAGNOSIS — I10 ESSENTIAL HYPERTENSION, BENIGN: ICD-10-CM

## 2025-03-31 DIAGNOSIS — E78.5 DYSLIPIDEMIA: ICD-10-CM

## 2025-03-31 DIAGNOSIS — R37 ORGANIC SEXUAL DYSFUNCTION: ICD-10-CM

## 2025-04-01 RX ORDER — ESCITALOPRAM OXALATE 10 MG/1
15 TABLET ORAL DAILY
Qty: 45 TABLET | Refills: 0 | Status: SHIPPED | OUTPATIENT
Start: 2025-04-01

## 2025-04-01 RX ORDER — LISINOPRIL AND HYDROCHLOROTHIAZIDE 12.5; 2 MG/1; MG/1
TABLET ORAL
Qty: 60 TABLET | Refills: 0 | Status: SHIPPED | OUTPATIENT
Start: 2025-04-01

## 2025-04-01 RX ORDER — AMLODIPINE BESYLATE 5 MG/1
5 TABLET ORAL DAILY
Qty: 30 TABLET | Refills: 0 | Status: SHIPPED | OUTPATIENT
Start: 2025-04-01

## 2025-04-01 RX ORDER — SILDENAFIL 100 MG/1
100 TABLET, FILM COATED ORAL
Qty: 30 TABLET | Refills: 0 | Status: SHIPPED | OUTPATIENT
Start: 2025-04-01

## 2025-04-01 RX ORDER — ATORVASTATIN CALCIUM 20 MG/1
TABLET, FILM COATED ORAL
Qty: 30 TABLET | Refills: 0 | Status: SHIPPED | OUTPATIENT
Start: 2025-04-01

## 2025-04-09 ENCOUNTER — OFFICE VISIT (OUTPATIENT)
Dept: URGENT CARE | Facility: URGENT CARE | Age: 70
End: 2025-04-09
Payer: MEDICARE

## 2025-04-09 VITALS
WEIGHT: 207 LBS | BODY MASS INDEX: 29.7 KG/M2 | DIASTOLIC BLOOD PRESSURE: 88 MMHG | TEMPERATURE: 98.3 F | HEART RATE: 68 BPM | SYSTOLIC BLOOD PRESSURE: 158 MMHG | RESPIRATION RATE: 20 BRPM | OXYGEN SATURATION: 98 %

## 2025-04-09 DIAGNOSIS — L30.9 DERMATITIS: Primary | ICD-10-CM

## 2025-04-09 PROCEDURE — 3077F SYST BP >= 140 MM HG: CPT

## 2025-04-09 PROCEDURE — 99203 OFFICE O/P NEW LOW 30 MIN: CPT

## 2025-04-09 PROCEDURE — 3079F DIAST BP 80-89 MM HG: CPT

## 2025-04-09 RX ORDER — PREDNISONE 20 MG/1
TABLET ORAL
Qty: 20 TABLET | Refills: 0 | Status: SHIPPED | OUTPATIENT
Start: 2025-04-09

## 2025-04-09 NOTE — PROGRESS NOTES
URGENT CARE  Assessment & Plan   Assessment:   Raji Chahal is a 69 year old male who's clinical presentation today is consistent with:   1. Dermatitis    - predniSONE (DELTASONE) 20 MG tablet;    Plan:  Rash and upper eyelid swelling appears most consistent with an allergic dermatitis or allergic reaction, will treat supportively and symptomatically, recommend a trial of antihistamines and oral steroids. Also recommend identification and avoidance of the irritant if possible; patient denies any known or new exposures. Other causes of dermatitis may be stress, sun exposure, extreme temperatures etc. No obvious indications of shingles, strep rash/infection, bacterial or fungal infections. Does not appear to be cellulitis   Additionally we discussed if symptoms do not improve after starting today's treatment to follow up in 3-5 days, sooner if symptoms worsen, return precautions given    No alarm signs or symptoms present   Differential Diagnoses for this patient's chief complaint that I considered include:  Atopic vs allergic vs contact dermatitis, cellulitis, Insect bite/sting, drug reaction, eczema, psoriasis, scabies, tinea,     KYLE Andrew [facility] [place]      ______________________________________________________________________      Subjective     HPI: Raji Chahal  is a 69 year old  male who presents today for evaluation the following concerns:   Patient reports a rash noted to forehead, and upper eyelid swelling , which started a few days ago    Patient endorses the rash is  pruritic.   Patient states he tried warm packs and it got better   Patient states the rash is: Pink, irritative and itchy, and only upper eyelids are swollen   Denies any changes to vision, denies any discharge from eyes, except tears   Denies any current fevers.  Denies any vesicles or blisters.    Review of Systems:  Pertinent review of systems as reflected in HPI, otherwise negative.      Objective    Physical Exam:  Vitals:    04/09/25 1724   BP: (!) 158/88   Pulse: 68   Resp: 20   Temp: 98.3  F (36.8  C)   SpO2: 98%   Weight: 93.9 kg (207 lb)      General:   alert and oriented, no acute distress, non ill-appearing   Vital signs reviewed: afebrile and normotensive     SKIN:   Fore head, bilaterally and right cheek:   Noted diffuse, slightly raised, maculopapular lesions that are erythematous and generalized  Lesions do not follow any specific distribution   no Vesicles bullae    no overt warmth noted, no edema, no pain to palpation.     No drainage noted, no signs of cellulitis-type infection   No abscess seen   EYES: EOMs intact, PERRLA bilaterally   Conjunctiva: clear  Upper eyelid - edema present no erythema, no crusting or drainage            ______________________________________________________________________    I explained my diagnostic considerations and recommendations to the patient  All questions were answered.   Please see AVS for any patient instructions & handouts given.

## 2025-04-09 NOTE — PATIENT INSTRUCTIONS
Diagnosis: dermatitis   Plan:   Itch relief : zyrtec  antihistamine at night daily   Oral steroids    Supportive   Keep rash open, do not cover, air helps to dry rash   Wear loose clothing   Avoid soaps, harsh chemicals   Warm compress   Prevention   Try to identify irritant and avoid   Follow up     Monitor for:   Fever of 102 F  Redness or swelling that gets worse  Pain that gets worse. Babies may show pain with fussiness that can't be soothed.  Bad-smelling fluid leaking from the skin  New rash on other parts of the body. This includes around the eyes, mouth, or genitals.  Difficulty breathing or shortness of breath         Contact Dermatitis:   Contact dermatitis is a skin rash caused by something that touches the skin and makes it irritated and inflamed.    skin may be red, swollen, dry, and cracked. Blisters may form and ooze. The rash will itch.  Contact dermatitis can form anywhere on body.   It can be caused from exposure to animals, soaps and detergents, plants, such as poison ivy, oak, or sumac.    Other common causes are metals such as jewelry or new skin creams   Contact dermatitis is not passed from person to person.      Long-term risks of topical corticosteroids:    - skin atrophy (thinning), telangiectasia, striae (stretch marks), glaucoma, adrenocortical suppression,   rebound flare, steroid withdrawal,    Do not apply to thin skin areas such as face or groin     Common triggers: harsh soaps, detergents,    Wool, abrasive fabrics, tight-fitting clothing ,    Chemicals: formaldehyde, airborne irrit    (smk, tobacco, air pollution)    And extreme transitions in temp, cold temperatures    Hot water

## 2025-06-17 DIAGNOSIS — F41.9 ANXIETY: ICD-10-CM

## 2025-06-17 DIAGNOSIS — I10 ESSENTIAL HYPERTENSION, BENIGN: ICD-10-CM

## 2025-06-17 DIAGNOSIS — E78.5 DYSLIPIDEMIA: ICD-10-CM

## 2025-06-17 RX ORDER — LISINOPRIL AND HYDROCHLOROTHIAZIDE 12.5; 2 MG/1; MG/1
1 TABLET ORAL 2 TIMES DAILY
Qty: 60 TABLET | Refills: 0 | Status: SHIPPED | OUTPATIENT
Start: 2025-06-17

## 2025-06-17 RX ORDER — AMLODIPINE BESYLATE 5 MG/1
5 TABLET ORAL DAILY
Qty: 30 TABLET | Refills: 0 | Status: SHIPPED | OUTPATIENT
Start: 2025-06-17

## 2025-06-17 RX ORDER — ATORVASTATIN CALCIUM 20 MG/1
20 TABLET, FILM COATED ORAL AT BEDTIME
Qty: 30 TABLET | Refills: 0 | Status: SHIPPED | OUTPATIENT
Start: 2025-06-17

## 2025-06-17 RX ORDER — ESCITALOPRAM OXALATE 10 MG/1
15 TABLET ORAL DAILY
Qty: 45 TABLET | Refills: 0 | Status: SHIPPED | OUTPATIENT
Start: 2025-06-17

## 2025-06-24 SDOH — HEALTH STABILITY: PHYSICAL HEALTH: ON AVERAGE, HOW MANY DAYS PER WEEK DO YOU ENGAGE IN MODERATE TO STRENUOUS EXERCISE (LIKE A BRISK WALK)?: 3 DAYS

## 2025-06-24 SDOH — HEALTH STABILITY: PHYSICAL HEALTH: ON AVERAGE, HOW MANY MINUTES DO YOU ENGAGE IN EXERCISE AT THIS LEVEL?: 30 MIN

## 2025-06-24 ASSESSMENT — SOCIAL DETERMINANTS OF HEALTH (SDOH): HOW OFTEN DO YOU GET TOGETHER WITH FRIENDS OR RELATIVES?: THREE TIMES A WEEK

## 2025-06-25 ENCOUNTER — OFFICE VISIT (OUTPATIENT)
Dept: FAMILY MEDICINE | Facility: CLINIC | Age: 70
End: 2025-06-25
Payer: MEDICARE

## 2025-06-25 ENCOUNTER — ORDERS ONLY (AUTO-RELEASED) (OUTPATIENT)
Dept: FAMILY MEDICINE | Facility: CLINIC | Age: 70
End: 2025-06-25

## 2025-06-25 VITALS
RESPIRATION RATE: 16 BRPM | OXYGEN SATURATION: 96 % | BODY MASS INDEX: 29.63 KG/M2 | WEIGHT: 207 LBS | HEIGHT: 70 IN | SYSTOLIC BLOOD PRESSURE: 138 MMHG | TEMPERATURE: 97.6 F | HEART RATE: 69 BPM | DIASTOLIC BLOOD PRESSURE: 88 MMHG

## 2025-06-25 DIAGNOSIS — Z12.11 SCREEN FOR COLON CANCER: ICD-10-CM

## 2025-06-25 DIAGNOSIS — T14.8XXA FOREIGN BODY IN SKIN: ICD-10-CM

## 2025-06-25 DIAGNOSIS — Z00.00 ENCOUNTER FOR MEDICARE ANNUAL WELLNESS EXAM: ICD-10-CM

## 2025-06-25 DIAGNOSIS — Z13.6 SCREENING FOR AAA (ABDOMINAL AORTIC ANEURYSM): ICD-10-CM

## 2025-06-25 DIAGNOSIS — F41.9 ANXIETY: ICD-10-CM

## 2025-06-25 DIAGNOSIS — I10 ESSENTIAL HYPERTENSION, BENIGN: Primary | ICD-10-CM

## 2025-06-25 DIAGNOSIS — R37 ORGANIC SEXUAL DYSFUNCTION: ICD-10-CM

## 2025-06-25 DIAGNOSIS — M62.838 SPASM OF MUSCLE: ICD-10-CM

## 2025-06-25 DIAGNOSIS — Z11.59 NEED FOR HEPATITIS C SCREENING TEST: ICD-10-CM

## 2025-06-25 DIAGNOSIS — E78.5 DYSLIPIDEMIA: ICD-10-CM

## 2025-06-25 DIAGNOSIS — Z13.1 SCREENING FOR DIABETES MELLITUS: ICD-10-CM

## 2025-06-25 LAB
ANION GAP SERPL CALCULATED.3IONS-SCNC: 13 MMOL/L (ref 7–15)
BUN SERPL-MCNC: 29.7 MG/DL (ref 8–23)
CALCIUM SERPL-MCNC: 9.9 MG/DL (ref 8.8–10.4)
CHLORIDE SERPL-SCNC: 102 MMOL/L (ref 98–107)
CHOLEST SERPL-MCNC: 146 MG/DL
CREAT SERPL-MCNC: 1.29 MG/DL (ref 0.67–1.17)
EGFRCR SERPLBLD CKD-EPI 2021: 60 ML/MIN/1.73M2
EST. AVERAGE GLUCOSE BLD GHB EST-MCNC: 128 MG/DL
FASTING STATUS PATIENT QL REPORTED: NO
FASTING STATUS PATIENT QL REPORTED: NO
GLUCOSE SERPL-MCNC: 117 MG/DL (ref 70–99)
HBA1C MFR BLD: 6.1 % (ref 0–5.6)
HCO3 SERPL-SCNC: 23 MMOL/L (ref 22–29)
HDLC SERPL-MCNC: 49 MG/DL
LDLC SERPL CALC-MCNC: 72 MG/DL
NONHDLC SERPL-MCNC: 97 MG/DL
POTASSIUM SERPL-SCNC: 5.1 MMOL/L (ref 3.4–5.3)
SODIUM SERPL-SCNC: 138 MMOL/L (ref 135–145)
TRIGL SERPL-MCNC: 124 MG/DL

## 2025-06-25 PROCEDURE — 36415 COLL VENOUS BLD VENIPUNCTURE: CPT | Performed by: PHYSICIAN ASSISTANT

## 2025-06-25 PROCEDURE — 99214 OFFICE O/P EST MOD 30 MIN: CPT | Mod: 25 | Performed by: PHYSICIAN ASSISTANT

## 2025-06-25 PROCEDURE — G0439 PPPS, SUBSEQ VISIT: HCPCS | Performed by: PHYSICIAN ASSISTANT

## 2025-06-25 PROCEDURE — 3075F SYST BP GE 130 - 139MM HG: CPT | Performed by: PHYSICIAN ASSISTANT

## 2025-06-25 PROCEDURE — 80061 LIPID PANEL: CPT | Performed by: PHYSICIAN ASSISTANT

## 2025-06-25 PROCEDURE — 1126F AMNT PAIN NOTED NONE PRSNT: CPT | Performed by: PHYSICIAN ASSISTANT

## 2025-06-25 PROCEDURE — 83036 HEMOGLOBIN GLYCOSYLATED A1C: CPT | Performed by: PHYSICIAN ASSISTANT

## 2025-06-25 PROCEDURE — 86803 HEPATITIS C AB TEST: CPT | Performed by: PHYSICIAN ASSISTANT

## 2025-06-25 PROCEDURE — 80048 BASIC METABOLIC PNL TOTAL CA: CPT | Performed by: PHYSICIAN ASSISTANT

## 2025-06-25 PROCEDURE — 3079F DIAST BP 80-89 MM HG: CPT | Performed by: PHYSICIAN ASSISTANT

## 2025-06-25 PROCEDURE — 3044F HG A1C LEVEL LT 7.0%: CPT | Performed by: PHYSICIAN ASSISTANT

## 2025-06-25 RX ORDER — LISINOPRIL AND HYDROCHLOROTHIAZIDE 12.5; 2 MG/1; MG/1
1 TABLET ORAL 2 TIMES DAILY
Qty: 180 TABLET | Refills: 3 | Status: SHIPPED | OUTPATIENT
Start: 2025-06-25

## 2025-06-25 RX ORDER — AMLODIPINE BESYLATE 5 MG/1
5 TABLET ORAL DAILY
Qty: 90 TABLET | Refills: 3 | Status: SHIPPED | OUTPATIENT
Start: 2025-06-25

## 2025-06-25 RX ORDER — ASPIRIN 81 MG/1
81 TABLET ORAL DAILY
COMMUNITY
Start: 2025-06-25

## 2025-06-25 RX ORDER — CYCLOBENZAPRINE HCL 10 MG
10 TABLET ORAL 3 TIMES DAILY PRN
Qty: 90 TABLET | Refills: 2 | Status: SHIPPED | OUTPATIENT
Start: 2025-06-25

## 2025-06-25 RX ORDER — SILDENAFIL 100 MG/1
100 TABLET, FILM COATED ORAL DAILY PRN
Qty: 30 TABLET | Refills: 2 | Status: SHIPPED | OUTPATIENT
Start: 2025-06-25

## 2025-06-25 RX ORDER — ESCITALOPRAM OXALATE 10 MG/1
15 TABLET ORAL DAILY
Qty: 135 TABLET | Refills: 3 | Status: SHIPPED | OUTPATIENT
Start: 2025-06-25

## 2025-06-25 RX ORDER — ATORVASTATIN CALCIUM 20 MG/1
20 TABLET, FILM COATED ORAL AT BEDTIME
Qty: 90 TABLET | Refills: 3 | Status: SHIPPED | OUTPATIENT
Start: 2025-06-25

## 2025-06-25 ASSESSMENT — ANXIETY QUESTIONNAIRES
2. NOT BEING ABLE TO STOP OR CONTROL WORRYING: NOT AT ALL
IF YOU CHECKED OFF ANY PROBLEMS ON THIS QUESTIONNAIRE, HOW DIFFICULT HAVE THESE PROBLEMS MADE IT FOR YOU TO DO YOUR WORK, TAKE CARE OF THINGS AT HOME, OR GET ALONG WITH OTHER PEOPLE: NOT DIFFICULT AT ALL
4. TROUBLE RELAXING: NOT AT ALL
7. FEELING AFRAID AS IF SOMETHING AWFUL MIGHT HAPPEN: NOT AT ALL
6. BECOMING EASILY ANNOYED OR IRRITABLE: SEVERAL DAYS
5. BEING SO RESTLESS THAT IT IS HARD TO SIT STILL: NOT AT ALL
GAD7 TOTAL SCORE: 2
1. FEELING NERVOUS, ANXIOUS, OR ON EDGE: SEVERAL DAYS
3. WORRYING TOO MUCH ABOUT DIFFERENT THINGS: NOT AT ALL
7. FEELING AFRAID AS IF SOMETHING AWFUL MIGHT HAPPEN: NOT AT ALL
8. IF YOU CHECKED OFF ANY PROBLEMS, HOW DIFFICULT HAVE THESE MADE IT FOR YOU TO DO YOUR WORK, TAKE CARE OF THINGS AT HOME, OR GET ALONG WITH OTHER PEOPLE?: NOT DIFFICULT AT ALL

## 2025-06-25 ASSESSMENT — PATIENT HEALTH QUESTIONNAIRE - PHQ9
SUM OF ALL RESPONSES TO PHQ QUESTIONS 1-9: 2
SUM OF ALL RESPONSES TO PHQ QUESTIONS 1-9: 2
10. IF YOU CHECKED OFF ANY PROBLEMS, HOW DIFFICULT HAVE THESE PROBLEMS MADE IT FOR YOU TO DO YOUR WORK, TAKE CARE OF THINGS AT HOME, OR GET ALONG WITH OTHER PEOPLE: NOT DIFFICULT AT ALL

## 2025-06-25 ASSESSMENT — PAIN SCALES - GENERAL: PAINLEVEL_OUTOF10: NO PAIN (0)

## 2025-06-25 NOTE — PROGRESS NOTES
Preventive Care Visit  [facility]  Roberto Mendoza PA-C, Family Medicine  Jun 25, 2025      Assessment & Plan   Essential hypertension, benign  Normotensive here in clinic and at home.  He is considering decreasing/stopping his amlodipine due to weight loss which I think is reasonable.  He will hold this at home for 1 month and monitor his blood pressure.  He can maintain off of it if his blood pressure is controlled otherwise if it creeps back up again he would just restart.  Recheck BMP  - BASIC METABOLIC PANEL; Future  - amLODIPine (NORVASC) 5 MG tablet; Take 1 tablet (5 mg) by mouth daily.  - lisinopril-hydrochlorothiazide (ZESTORETIC) 20-12.5 MG tablet; Take 1 tablet by mouth 2 times daily.    Dyslipidemia  Due for recheck and refill  - Lipid panel reflex to direct LDL Non-fasting; Future  - atorvastatin (LIPITOR) 20 MG tablet; Take 1 tablet (20 mg) by mouth at bedtime.    Organic sexual dysfunction  Refilled  - sildenafil (VIAGRA) 100 MG tablet; Take 1 tablet (100 mg) by mouth daily as needed (at least 30 minutes prior to intercourse).    Spasm of muscle  Stable refilled  - cyclobenzaprine (FLEXERIL) 10 MG tablet; Take 1 tablet (10 mg) by mouth 3 times daily as needed for muscle spasms.    Anxiety  Symptoms are stable.  PHQ was within normal limits.  Refilled  - escitalopram (LEXAPRO) 10 MG tablet; Take 1.5 tablets (15 mg) by mouth daily.    Encounter for Medicare annual wellness exam  69 yr old here for Medicare Wellness exam. Last MWE done 1 year(s) ago. Discussed preventative screenings which are updated below. Counseled on immunizations and healthy lifestyle. Follow-up in 1 year for repeat physical exam.     Need for hepatitis C screening test  Due for screening   - Hepatitis C Screen Reflex to HCV RNA Quant and Genotype; Future    Screen for colon cancer  Due for screening   - COLOGUARD(EXACT SCIENCES); Future    Screening for diabetes mellitus  Due for screening   -  "Hemoglobin A1c; Future    Screening for AAA (abdominal aortic aneurysm)  Due for screening   - US Abdominal Aorta Imaging; Future    Foreign body in skin  Per patient request.   - Adult Dermatology  Referral; Future    Patient has been advised of split billing requirements and indicates understanding: Yes    BMI  Estimated body mass index is 29.7 kg/m  as calculated from the following:    Height as of this encounter: 1.778 m (5' 10\").    Weight as of this encounter: 93.9 kg (207 lb).     Counseling  Appropriate preventive services were addressed with this patient via screening, questionnaire, or discussion as appropriate for fall prevention, nutrition, physical activity, Tobacco-use cessation, social engagement, weight loss and cognition.  Checklist reviewing preventive services available has been given to the patient.  Reviewed patient's diet, addressing concerns and/or questions.   He is at risk for lack of exercise and has been provided with information to increase physical activity for the benefit of his well-being.   The patient was instructed to see the dentist every 6 months.     Rachael Alegria is a 69 year old, presenting for the following:  Physical        6/25/2025    12:38 PM   Additional Questions   Roomed by Ni YADAV    Hyperlipidemia Follow-Up  Are you regularly taking any medication or supplement to lower your cholesterol?   Yes   Are you having muscle aches or other side effects that you think could be caused by your cholesterol lowering medication?  No    Hypertension Follow-up    Do you check your blood pressure regularly outside of the clinic? Yes   Are you following a low salt diet? Yes  Are your blood pressures ever more than 140 on the top number (systolic) OR more   than 90 on the bottom number (diastolic), for example 140/90? No    BP Readings from Last 2 Encounters:   06/25/25 (!) 140/88   04/09/25 (!) 158/88     Advance Care Planning    Health Care Directive received " at today's visit.  Forwarded to [employer].        6/24/2025   General Health   How would you rate your overall physical health? Good   Feel stress (tense, anxious, or unable to sleep) Only a little   (!) STRESS CONCERN      6/24/2025   Nutrition   Diet: Regular (no restrictions)         6/24/2025   Exercise   Days per week of moderate/strenous exercise 3 days   Average minutes spent exercising at this level 30 min         6/24/2025   Social Factors   Frequency of gathering with friends or relatives Three times a week   Worry food won't last until get money to buy more No   Food not last or not have enough money for food? No   Do you have housing? (Housing is defined as stable permanent housing and does not include staying outside in a car, in a tent, in an abandoned building, in an overnight shelter, or couch-surfing.) Yes   Are you worried about losing your housing? No   Lack of transportation? No   Unable to get utilities (heat,electricity)? No         6/24/2025   Fall Risk   Fallen 2 or more times in the past year? No   Trouble with walking or balance? No          6/24/2025   Activities of Daily Living- Home Safety   Needs help with the following daily activites None of the above   Safety concerns in the home None of the above         6/24/2025   Dental   Dentist two times every year? (!) NO         6/24/2025   Hearing Screening   Hearing concerns? None of the above         6/24/2025   Driving Risk Screening   Patient/family members have concerns about driving No         6/24/2025   General Alertness/Fatigue Screening   Have you been more tired than usual lately? No         6/24/2025   Urinary Incontinence Screening   Bothered by leaking urine in past 6 months No       Today's PHQ-9 Score:       6/25/2025    11:35 AM   PHQ-9 SCORE   PHQ-9 Total Score MyChart 2 (Minimal depression)   PHQ-9 Total Score 2        Patient-reported         6/24/2025   Substance Use   Alcohol more than 3/day or more than 7/wk No    Do you have a current opioid prescription? No   How severe/bad is pain from 1 to 10? 0/10 (No Pain)   Do you use any other substances recreationally? (!) CANNABIS PRODUCTS   Smokes - Uses 2-3 gram.     Social History     Tobacco Use    Smoking status: Former     Current packs/day: 0.50     Average packs/day: 0.5 packs/day for 10.0 years (5.0 ttl pk-yrs)     Types: Cigarettes    Smokeless tobacco: Former   Vaping Use    Vaping status: Never Used   Substance Use Topics    Alcohol use: Yes     Alcohol/week: 2.0 standard drinks of alcohol    Drug use: Yes     Types: Marijuana           6/24/2025   AAA Screening   Family history of Abdominal Aortic Aneurysm (AAA)? No   ASCVD Risk   The 10-year ASCVD risk score (Marbin SEVILLA, et al., 2019) is: 21.4%    Values used to calculate the score:      Age: 69 years      Sex: Male      Is Non- : No      Diabetic: No      Tobacco smoker: No      Systolic Blood Pressure: 140 mmHg      Is BP treated: Yes      HDL Cholesterol: 41 mg/dL      Total Cholesterol: 131 mg/dL              Reviewed and updated as needed this visit by Provider   Tobacco  Allergies  Meds  Problems  Med Hx  Surg Hx  Fam Hx            Current providers sharing in care for this patient include:  Patient Care Team:  Armen Perez MD as PCP - General (Family Medicine)  Armen Perez MD as Assigned PCP    The following health maintenance items are reviewed in Epic and correct as of today:  Health Maintenance   Topic Date Due    HEPATITIS C SCREENING  Never done    LUNG CANCER SCREENING  Never done    AORTIC ANEURYSM SCREENING (SYSTEM ASSIGNED)  Never done    COLORECTAL CANCER SCREENING  06/15/2023    BMP  03/11/2025    LIPID  03/11/2025    COVID-19 VACCINE (7 - 2024-25 season) 06/05/2025    MEDICARE ANNUAL WELLNESS VISIT  06/25/2026    ANNUAL REVIEW OF HM ORDERS  06/25/2026    FALL RISK ASSESSMENT  06/25/2026    DTAP/TDAP/TD VACCINE (2 - Td or Tdap) 11/09/2027    DIABETES  "SCREENING  06/25/2028    RSV VACCINE (1 - 1-dose 75+ series) 08/14/2029    ADVANCE CARE PLANNING  06/25/2030    PHQ-2 (once per calendar year)  Completed    INFLUENZA VACCINE  Completed    PNEUMOCOCCAL VACCINE 50+ YEARS  Completed    ZOSTER VACCINE  Completed    HPV VACCINE  Aged Out    MENINGITIS VACCINE  Aged Out     Review of Systems  See HPI      Objective    Exam  BP (!) 140/88   Pulse 69   Temp 97.6  F (36.4  C) (Tympanic)   Resp 16   Ht 1.778 m (5' 10\")   Wt 93.9 kg (207 lb)   SpO2 96%   BMI 29.70 kg/m     Estimated body mass index is 29.7 kg/m  as calculated from the following:    Height as of this encounter: 1.778 m (5' 10\").    Weight as of this encounter: 93.9 kg (207 lb).    Physical Exam  GENERAL: alert and no distress  NECK: no adenopathy, no asymmetry, masses, or scars  RESP: lungs clear to auscultation - no rales, rhonchi or wheezes  CV: regular rate and rhythm, normal S1 S2, no S3 or S4, no murmur, click or rub, no peripheral edema  ABDOMEN: soft, nontender, no hepatosplenomegaly, no masses and bowel sounds normal  MS: no gross musculoskeletal defects noted, no edema  SKIN: no suspicious lesions or rashes and along the lumbar spine area there are what appear to be splinter like foreign bodies with callus spread throughout.         6/25/2025   Mini Cog   Clock Draw Score 2 Normal   3 Item Recall 2 objects recalled   Mini Cog Total Score 4              Signed Electronically by: Roberto Mendoza PA-C    "

## 2025-06-25 NOTE — PATIENT INSTRUCTIONS
Patient Education   Preventive Care Advice   This is general advice given by our system to help you stay healthy. However, your care team may have specific advice just for you. Please talk to your care team about your preventive care needs.  Nutrition  Eat 5 or more servings of fruits and vegetables each day.  Try wheat bread, brown rice and whole grain pasta (instead of white bread, rice, and pasta).  Get enough calcium and vitamin D. Check the label on foods and aim for 100% of the RDA (recommended daily allowance).  Lifestyle  Exercise at least 150 minutes each week  (30 minutes a day, 5 days a week).  Do muscle strengthening activities 2 days a week. These help control your weight and prevent disease.  No smoking.  Wear sunscreen to prevent skin cancer.  Have a dental exam and cleaning every 6 months.  Yearly exams  See your health care team every year to talk about:  Any changes in your health.  Any medicines your care team has prescribed.  Preventive care, family planning, and ways to prevent chronic diseases.  Shots (vaccines)   HPV shots (up to age 26), if you've never had them before.  Hepatitis B shots (up to age 59), if you've never had them before.  COVID-19 shot: Get this shot when it's due.  Flu shot: Get a flu shot every year.  Tetanus shot: Get a tetanus shot every 10 years.  Pneumococcal, hepatitis A, and RSV shots: Ask your care team if you need these based on your risk.  Shingles shot (for age 50 and up)  General health tests  Diabetes screening:  Starting at age 35, Get screened for diabetes at least every 3 years.  If you are younger than age 35, ask your care team if you should be screened for diabetes.  Cholesterol test: At age 39, start having a cholesterol test every 5 years, or more often if advised.  Bone density scan (DEXA): At age 50, ask your care team if you should have this scan for osteoporosis (brittle bones).  Hepatitis C: Get tested at least once in your life.  STIs (sexually  transmitted infections)  Before age 24: Ask your care team if you should be screened for STIs.  After age 24: Get screened for STIs if you're at risk. You are at risk for STIs (including HIV) if:  You are sexually active with more than one person.  You don't use condoms every time.  You or a partner was diagnosed with a sexually transmitted infection.  If you are at risk for HIV, ask about PrEP medicine to prevent HIV.  Get tested for HIV at least once in your life, whether you are at risk for HIV or not.  Cancer screening tests  Cervical cancer screening: If you have a cervix, begin getting regular cervical cancer screening tests starting at age 21.  Breast cancer scan (mammogram): If you've ever had breasts, begin having regular mammograms starting at age 40. This is a scan to check for breast cancer.  Colon cancer screening: It is important to start screening for colon cancer at age 45.  Have a colonoscopy test every 10 years (or more often if you're at risk) Or, ask your provider about stool tests like a FIT test every year or Cologuard test every 3 years.  To learn more about your testing options, visit:   .  For help making a decision, visit:   https://bit.ly/sa01957.  Prostate cancer screening test: If you have a prostate, ask your care team if a prostate cancer screening test (PSA) at age 55 is right for you.  Lung cancer screening: If you are a current or former smoker ages 50 to 80, ask your care team if ongoing lung cancer screenings are right for you.  For informational purposes only. Not to replace the advice of your health care provider. Copyright   2023 [employer]. All rights reserved. Clinically reviewed by the [facility] Transitions Program. [employer] 227910 - REV 01/24.

## 2025-06-26 ENCOUNTER — PATIENT OUTREACH (OUTPATIENT)
Dept: CARE COORDINATION | Facility: CLINIC | Age: 70
End: 2025-06-26
Payer: MEDICARE

## 2025-06-26 ENCOUNTER — RESULTS FOLLOW-UP (OUTPATIENT)
Dept: FAMILY MEDICINE | Facility: CLINIC | Age: 70
End: 2025-06-26

## 2025-06-26 LAB — HCV AB SERPL QL IA: NONREACTIVE

## 2025-06-30 ENCOUNTER — PATIENT OUTREACH (OUTPATIENT)
Dept: CARE COORDINATION | Facility: CLINIC | Age: 70
End: 2025-06-30
Payer: MEDICARE

## 2025-07-23 LAB — NONINV COLON CA DNA+OCC BLD SCRN STL QL: NEGATIVE
